# Patient Record
Sex: MALE | Race: WHITE | Employment: FULL TIME | URBAN - METROPOLITAN AREA
[De-identification: names, ages, dates, MRNs, and addresses within clinical notes are randomized per-mention and may not be internally consistent; named-entity substitution may affect disease eponyms.]

---

## 2019-01-28 ENCOUNTER — HOSPITAL ENCOUNTER (OUTPATIENT)
Dept: BEHAVIORAL HEALTH | Facility: CLINIC | Age: 29
Discharge: HOME | End: 2019-01-28
Attending: SOCIAL WORKER | Admitting: SOCIAL WORKER
Payer: COMMERCIAL

## 2019-01-28 VITALS
SYSTOLIC BLOOD PRESSURE: 146 MMHG | HEART RATE: 81 BPM | WEIGHT: 243 LBS | HEIGHT: 76 IN | DIASTOLIC BLOOD PRESSURE: 100 MMHG | BODY MASS INDEX: 29.59 KG/M2

## 2019-01-28 PROCEDURE — H0001 ALCOHOL AND/OR DRUG ASSESS: HCPCS

## 2019-01-28 RX ORDER — SERTRALINE HYDROCHLORIDE 25 MG/1
25 TABLET, FILM COATED ORAL
COMMUNITY
Start: 2019-01-22 | End: 2020-02-11

## 2019-01-28 ASSESSMENT — ANXIETY QUESTIONNAIRES
GAD7 TOTAL SCORE: 16
2. NOT BEING ABLE TO STOP OR CONTROL WORRYING: MORE THAN HALF THE DAYS
3. WORRYING TOO MUCH ABOUT DIFFERENT THINGS: MORE THAN HALF THE DAYS
6. BECOMING EASILY ANNOYED OR IRRITABLE: NEARLY EVERY DAY
1. FEELING NERVOUS, ANXIOUS, OR ON EDGE: MORE THAN HALF THE DAYS
5. BEING SO RESTLESS THAT IT IS HARD TO SIT STILL: SEVERAL DAYS
4. TROUBLE RELAXING: NEARLY EVERY DAY
7. FEELING AFRAID AS IF SOMETHING AWFUL MIGHT HAPPEN: NEARLY EVERY DAY

## 2019-01-28 ASSESSMENT — MIFFLIN-ST. JEOR: SCORE: 2178.74

## 2019-01-28 ASSESSMENT — PAIN SCALES - GENERAL: PAINLEVEL: NO PAIN (0)

## 2019-01-28 NOTE — PROGRESS NOTES
[facility]  [address] [place] [id number]      ADULT CD ASSESSMENT ADDENDUM      Patient Name: Nolan Mayo  Cell Phone:   Home: [phone] (home)    Mobile:   Telephone Information:   Mobile [phone]       Email:  [email or web address]  Emergency Contact: Edyta Mayo (wife)    Tel: [phone]    The patient reported being:      With which race do you identify? White    Initial Screening Questions     1. Are you currently having severe withdrawal symptoms that are putting yourself or others in danger?  No    2. Are you currently having severe medical problems that require immediate attention?  No    3. Are you currently having severe emotional or behavioral problems that are putting yourself or others at risk of harm?  No    4. Do you have sufficient reading skills that will enable you to understand written materials, including the program rules and client rights materials?  Yes     Family History and other additional information     Who raised you? (parents, grandparents, adoptive parents, step-parents, etc.)    Both Parents    Please tell me what it was like growing up in your family. (please include any history of substance abuse, mental health issues, emotional/physical/sexual abuse, forms of discipline, and support)     He reported being raised by both parents as an only child.  He reported growing up in a consistently supportive environment, but his father was a heavy drinker and his father would usually pass out in the evening from his drinking.  He reported his father wasn't an angry or abusive when he drinking alcohol, but he reported his parents would have some arguments related to his father's heavy drinking of alcohol.  He denied being aware of any abuse issues in the family home.  He reported discipline as a child included getting spanked when he was a young child, but as he got older discipline would be having privileges taken away or being grounded.    Do you  have any children or Stepchildren? No    Are you being investigated by Child Protection Services? No    Do you have a child protection worker, probation office or ?  No    How would you describe your current finances?  Doing okay    If you are having problems, (unpaid bills, bankruptcy, IRS problems) please explain:  No    If working or a student are you able to function appropriately in that setting? Yes     Describe your preferred learning style:  by hands-on practice    What are your some of your personal strengths? Self sufficient, independent, and moderately good at everything.    Do you currently participate in community joi activities, such as attending Latter-day, temple, Hindu or Lutheran services?  No    How does your spirituality impact your recovery?  It has some impact and helps out in asking forgiveness.    Do you currently self-administer your medications?  Yes    Have you ever had to lie to people important to you about how much you leblanc?   No   Have you ever felt the need to bet more and more money?   No   Have you ever attempted treatment for a gambling problem?   No   Have you ever touched or fondled someone else inappropriately or forced them to have sex with you against their will?   No   Are you or have you ever been a registered sex offender?   No   Is there any history of sexual abuse in your family? No     Have you ever felt obsessed by your sexual behavior, such as having sex with many partners, masturbating often, using pornography often?   No     Have you ever received therapy or stayed in the hospital for mental health problems?   No     Have you ever hurt yourself, such as cutting, burning or hitting yourself?   No     Have you ever purged, binged or restricted yourself as a way to control your weight?   No     Are you on a special diet?   No     Do you have any concerns regarding your nutritional status?   No     Have you had any appetite changes in the last 3 months?    No   Have you had weight loss or weight gain of more than 10 lbs in the last 3 months?   If patient gained or lost more than 10 lbs, then refer to program RN / attending Physician for assessment.   Yes, explain: 67 lbs over the past year related to his alcohol intake.   Was the patient informed of BMI?    Above,  General nutrition education   Yes   Have you engaged in any risk-taking behavior that would put you at risk for exposure to blood-borne or sexually transmitted diseases?   No   Do you have any dental problems?   No   Have you ever lived through any trauma or stressful life events?   Yes, explain: The patient reported having some trauma issues in 2013 related to a break-up with an ex-girlfriend who cheated on him in at the same time when he was working at a dead-end job that he hated and felt stuck in at the time.     In the past month, have you had any of the following symptoms related to the trauma listed above? (dreams, intense memories, flashbacks, physical reactions, etc.)   Yes, explain: dreams of losing everything.   Have you ever believed people were spying on you, or that someone was plotting against you or trying to hurt you?   No   Have you ever believed someone was reading your mind or could hear your thoughts or that you could actually read someone's mind or hear what another person was thinking?   No   Have you ever believed that someone of some force outside of yourself was putting thoughts into your mind or made you act in a way that was not your usual self?  Have you ever though you were possessed?   No   Have you ever believed you were being sent special messages through the TV, radio or newspaper?   No   Have you ever heard things other people couldn't hear, such as voices or other noises?   No   Have you ever had visions when you were awake?  Or have you ever seen things other people couldn't see?   No   Do you have a valid 's license?    Yes     PHQ-9, JOSE L-7 and Suicide Risk  Assessment   PHQ-9 on 1/28/2019 JOSE L-7 on 1/28/2019   The patient's PHQ-9 score was 18 out of 27, indicating moderately severe depression.   The patient's JOSE L-7 score was 16 out of 21, indicating severe anxiety.       [place]-Suicide Severity Rating Scale   Suicide Ideation   1.) Have you ever wished you were dead or that you could go to sleep and not wake up?     Lifetime:  No   Past Month:  No     2.) Have you actually had any thoughts of killing yourself?   Lifetime:  No   Past Month:  No     3.) Have you been thinking about how you might do this?     Lifetime:  No   Past Month:  No     4.) Have you had these thoughts and had some intention of acting on them?     Lifetime:  No   Past Month:  No     5.) Have you started to work out the details of how to kill yourself?   Lifetime:  No   Past Month:  No     6.) Do you intend to carry out this plan?      Lifetime:  No   Past Month:  No     Intensity of Ideation   Intensity of ideation (1 being least severe, 5 being most severe):     Lifetime:  The patient denied ever having any suicidal thoughts in life.   Past Month:  The patient denied ever having any suicidal thoughts in life.     How often do you have these thoughts?  The patient denied ever having any suicidal thoughts in life.     When you have the thoughts how long do they last?  The patient denied ever having any suicidal thoughts in life.     Can you stop thinking about killing yourself or wanting to die if you want to?  The patient denied ever having any suicidal thoughts in life.     Are there things - anyone or anything (i.e. family, Advent, pain of death) that stopped you from wanting to die or acting on thoughts of suicide?  Does not apply     What sort of reasons did you have for thinking about wanting to die or killing yourself (ie end pain, stop how you were feeling, get attention or reaction, revenge)?  Does not apply     Suicidal Behavior   (Suicide Attempt) - Have you made a suicide attempt?      Lifetime:  The patient had never made a suicide attempt.   Past Month:  The patient had never made a suicide attempt.     Have you engaged in self-harm (non-suicidal self-injury)?  The patient denied having any history of engaging in self-harm (non-suicidal self-injury).     (Interrupted Attempt) - Has there been a time when you started to do something to end your life but someone or something stopped you before you actually did anything?  No     (Aborted or Self-Interrupted Attempt) - Has there been a time when you started to do something to try to end your life but you stopped yourself before you actually did anything?  No     (Preparatory Acts of Behavior) - Have you taken any steps towards making suicide attempt or preparing to kill yourself (such as collecting pills, getting a gun, giving valuables away or writing a suicide note)?  No     Actual Lethality/Medical Damage:  The patient denied ever making a suicidal attempt.       2008  [employer]  Used with permission by Shreya Lynch, PhD.       Guide to C-SSRS Risk Ratings   NO IDEATION:  with no active thoughts IDEATION: with a wish to die. IDEATION: with active thoughts. Risk Ratings   If Yes No No 0 - Very Low Risk   If NA Yes No 1 - Low Risk   If NA Yes Yes 2 - Low/moderate risk   IDEATION: associated thoughts of methods without intent or plan INTENT: Intent to follow through on suicide PLAN: Plan to follow through on suicide Risk Ratings cont...   If Yes No No 3 - Moderate Risk   If Yes Yes No 4 - High Risk   If Yes Yes Yes 5 - High Risk   The patient's ADDITIONAL RISK FACTORS and lack of PROTECTIVE FACTORS may increase their overall suicide risk ratings.     Additional Risk Factors:    Significant history of having untreated or poorly treated mental health symptoms     History of impulsive or aggressive behaviors   Protective Factors:    Having people in his/her life that would prevent the patient from considering a  "suicide attempt (i.e. young children, spouse, parents, etc.)     Having easy access to supportive family members     Having cultural, Yazidi or spiritual beliefs that discourage suicide     Risk Status   Past month:0. - Very Low Risk:  Evaluation Counselors:  Document in Epic / SBAR to counselor \"Very Low Risk\".      Treatment Counselors:  Reassess upon admission as applicable, assess weekly in progress notes under [facility] and summarize in Discharge / Treatment summary under [facility].    Past 24 hours:0. - Very Low Risk:  Evaluation Counselors:  Document in Epic / SBAR to counselor \"Very Low Risk\".      Treatment Counselors:  Reassess upon admission as applicable, assess weekly in progress notes under [facility] and summarize in Discharge / Treatment summary under [facility].   Additional information to support suicide risk rating: There was no additional information to provide at this time.     Mental Health Status   Physical Appearance/Attire: Appears stated age   Hygiene: well groomed   Eye Contact: at examiner   Speech Rate:  regular   Speech Volume: regular   Speech Quality: fluid   Cognitive/Perceptual:  reality based   Cognition: memory intact    Judgment: intact   Insight: intact   Orientation:  time, place, person and situation   Thought: logical    Hallucinations:  none   General Behavioral Tone: cooperative   Psychomotor Activity: no problem noted   Gait:  no problem   Mood: appropriate   Affect: congruence/appropriate   Counselor Notes: NA     Criteria for Diagnosis: DSM-5 Criteria for Substance Use Disorders      1.)  Alcohol Use Disorder Severe - 303.90 (F10.20)  2.)  Cannabis Use Disorder Severe - 304.30 (F12.20), in sustained remission  3.)  Tobacco Use Disorder Moderate - 305.10 (F17.200), in sustained remission  4.)  Anxiety disorder NOS, per patient self-report    Level of Care   I.) Intoxication and Withdrawal: 0   II.) Biomedical:  0   III.) Emotional and Behavioral:  2   IV.) " Readiness to Change:  2   V.) Relapse Potential: 3   VI.) Recovery Environmental: 2     Initial Problem List     The patient lacks relapse prevention skills  The patient has poor coping skills  The patient has poor refusal skills   The patient lacks a sober peer support network  The patient has dual issues of MI and CD    Patient/Client is willing to follow treatment recommendations.  Yes    Counselor: [facility] [place]    Vulnerable Adult Checklist for OUTPATIENTS     1.  Do you have a physical, emotional or mental infirmity or dysfunction?       Yes (explain) - History of Anxiety D/O NOS    2.  Does this issue impair your ability to provide for your own care without help, including providing yourself with food, shelter, clothing, healthcare or supervision?       No    3.  Because of this issue, I need assistance to protect myself from maltreatment by others.      No    Based on the above information:    This person is not a functional Vulnerable Adult according to [place] Statute 626.5572 subdivision 21.

## 2019-01-28 NOTE — PROGRESS NOTES
Rule 25 Assessment  Background Information   1. Date of Assessment Request  2. Date of Assessment  1/28/2019 3. Date Service Authorized     4.   SERJIO Coelho   5.  Phone Number   [phone] 6. Referent  [facility] 7. Assessment Site  [facility]     8. Client Name   Nolan Mayo 9. Date of Birth  1990 Age  28 year old 10. Gender  male  11. PMI/ Insurance No.  [id number]   12. Client's Primary Language:  English 13. Do you require special accommodations, such as an  or assistance with written material? No   14. Current Address: [address] [place] [address]   15. Client Phone Numbers: [phone] (home)      16. Tell me what has happened to bring you here today.    The patient came to [facility] for evaluation of a possible substance use disorder.  The reason for the substance abuse assessment was due to the patient's own awareness that he needed help and due to pressure from his wife for him to stop his use of alcohol.  The patient reported from 8/2017 until 3/2017, he had a pattern of drinking 1 pint to 1 liter of bourbon on an almost daily basis until stopping his use of alcohol completely in 3/2017.  The patient reported having no use of alcohol from 3/2017 until relapsing with alcohol again in early 11/2018.  The patient reported he had stopped drinking alcohol at that period of time secondary to being engaged to his wife who he  in 10/2018 because she did not approve of the patient drinking alcohol.  The patient reported relapsing with alcohol in early 11/2018, secondary to feeling bored and feeling like he would be able to control and/or hide his use of alcohol from others.  After relapsing in early 11/2018, his use of alcohol escalated back to drinking 1 pint to 1 liter of bourbon on an almost daily basis.  The patient reported having a job where he is relatively independent and he reported that he  had been pretty good at hiding his use of alcohol from others until more recently when it became apparent to his wife he had returned to drinking alcohol again.  The patient's wife has given him the ultimatum that she will not have children with him or continue in the relationship if he continues to drink alcohol.  The patient reported he wanted to enter the [facility] Primary Day Outpatient program at [facility] for primary substance abuse treatment.  The patient was informed the [facility] Day Outpatient program at [facility] was mainly for adults 40 and over, so there would not really be anyone in the treatment program in his same age range.  However, the patient stated it was the only one of our substance abuse treatment programs that would work with his current work hours, so he wished to proceed with a referral for primary substance abuse treatment at the [facility] Day Outpatient program at [facility].     17. Have you had other rule 25 assessments?     No    DIMENSION I - Acute Intoxication /Withdrawal Potential   1. Chemical use most recent 12 months outside a facility and other significant use history (client self-report)              X = Primary Drug Used   Age of First Use Most Recent Pattern of Use and Duration   Need enough information to show pattern (both frequency and amounts) and to show tolerance for each chemical that has a diagnosis   Date of last use and time, if needed   Withdrawal Potential? Requiring special care Method of use  (oral, smoked, snort, IV, etc)     X Alcohol     18 HU: 8/2017 until 3/2018, when he reported a pattern of drinking 1 pint to 1 liter of bourbon on an almost daily basis.    From 3/2018 until early 11/2018 no use of alcohol when he was meeting with a 1:1 counselor, attending AA meetings 1 time per week as well as being engaging to his wife during that period of time.    He reported getting  in 10/2018.    He relapsed with alcohol in  early 11/2018 after being bored and feeling like he could eventually go back to drinking alcohol.    Since early 11/2018, he reported a pattern of drinking 1 pint to 1 liter of bourbon on an almost daily basis.   1/20/2019    (750 mL of bourbon) None Oral      Marijuana/  Hashish   17 HU: 2010 to 2012, when he reported a pattern of smoking 1 bowl of THC on a daily basis.    Within the past year he reported smoking 1 bowl of THC on 2 occasions.   9/2018 None Smoke      Cocaine/Crack     No use          Meth/  Amphetamines   No use          Heroin     No use          Other Opiates/  Synthetics   No use          Inhalants     No use          Benzodiazepines     No use          Hallucinogens     22 Mushrooms 3 times in life 2013. 2013 None Oral      Barbiturates/  Sedatives/  Hypnotics No use          Over-the-Counter Drugs   No use          Other     23 Jennifer: 1 time in life at age 23. 23 None Oral      Nicotine     22 He reported smoking cigarettes a half year at a time between the ages of 22 and 27, when he reported a pattern of smoking 1 pack of cigarettes on a daily basis.   1 year ago None Oral     2. Do you use greater amounts of alcohol/other drugs to feel intoxicated or achieve the desired effect?  Yes.  Or use the same amount and get less of an effect?  Yes.  Example: The patient reported having increased use and tolerance issues with alcohol.    3A. Have you ever been to detox?     No    3B. When was the first time?     The patient denied ever having a detoxification admission.    3C. How many times since then?     The patient denied ever having a detoxification admission.    3D. Date of most recent detox:     The patient denied ever having a detoxification admission.    4.  Withdrawal symptoms: Have you had any of the following withdrawal symptoms?  Past 12 months Recent (past 30 days)   Unable to Sleep  Agitation  Headache  Fatigue / Extremely Tired  Sad / Depressed Feeling  Muscle Aches  Vivid / Unpleasant  Dreams  Irritability  Sensitivity to Noise  High Blood Pressure  Diarrhea  Confused / Disrupted Speech  Anxiety / Worried Unable to Sleep  Agitation  Headache  Fatigue / Extremely Tired  Sad / Depressed Feeling  Muscle Aches  Vivid / Unpleasant Dreams  Irritability  Sensitivity to Noise  High Blood Pressure  Diarrhea  Confused / Disrupted Speech  Anxiety / Worried     's Visual Observations and Symptoms: No visible withdrawal symptoms at this time    Based on the above information, is withdrawal likely to require attention as part of treatment participation?  Yes    Dimension I Ratings   Acute intoxication/Withdrawal potential - The placing authority must use the criteria in Dimension I to determine a client s acute intoxication and withdrawal potential.    RISK DESCRIPTIONS - Severity ratin Client displays full functioning with good ability to tolerate and cope with withdrawal discomfort. No signs or symptoms of intoxication or withdrawal or resolving signs or symptoms.    REASONS SEVERITY WAS ASSIGNED (What about the amount of the person s use and date of most recent use and history of withdrawal problems suggests the potential of withdrawal symptoms requiring professional assistance? )     The patient does not appear to have any risk of having significant withdrawal symptoms at this time.         DIMENSION II - Biomedical Complications and Conditions   1a. Do you have any current health/medical conditions?(Include any infectious diseases, allergies, or chronic or acute pain, history of chronic conditions)       Yes.   Illnesses/Medical Conditions you are receiving care for:   Past Medical History:   Diagnosis Date     Anxiety      Childhood asthma      1b. On a scale of mild, moderate to severe please specify the severity of the patient's diabetes and/or neuropathy.    The patient denied having a history of being diagnosed with diabetes or neuropathy.     2. Do you have a health care provider? When  was your most recent appointment? What concerns were identified?     The patient's Primary Medical Clinic is [facility].    3. If indicated by answers to items 1 or 2: How do you deal with these concerns? Is that working for you? If you are not receiving care for this problem, why not?      The patient reported taking prescription medications as prescribed for the above medical issues.    4A. List current medication(s) including over-the-counter or herbal supplements--including pain management:     Current Outpatient Medications   Medication     sertraline (ZOLOFT) 25 MG tablet     No current facility-administered medications for this encounter.      4B. Do you follow current medical recommendations/take medications as prescribed?     Yes    4C. When did you last take your medication?     2019    4D. Do you need a referral to have a follow up with a primary care physician?    No.    5. Has a health care provider/healer ever recommended that you reduce or quit alcohol/drug use?     Yes    6. Are you pregnant?     NA, because the patient is male    7. Have you had any injuries, assaults/violence towards you, accidents, health related issues, overdose(s) or hospitalizations related to your use of alcohol or other drugs:     Yes, explain: Minor bumps and bruises.    8. Do you have any specific physical needs/accommodations? No    Dimension II Ratings   Biomedical Conditions and Complications - The placing authority must use the criteria in Dimension II to determine a client s biomedical conditions and complications.   RISK DESCRIPTIONS - Severity ratin Client displays full functioning with good ability to cope with physical discomfort.    REASONS SEVERITY WAS ASSIGNED (What physical/medical problems does this person have that would inhibit his or her ability to participate in treatment? What issues does he or she have that require assistance to address?)    The patient reported having a history of the above  medical issues, but he reported being medically stable at this time and he denied having any other history of chronic medical problems.  The patient reported taking the above medications, but he denied taking any other prescription or OTC medications at this time.  The patient would benefit from following all of the recommendations of his medical providers.          DIMENSION III - Emotional, Behavioral, Cognitive Conditions and Complications   1. (Optional) Tell me what it was like growing up in your family. (substance use, mental health, discipline, abuse, support)     He reported being raised by both parents as an only child.  He reported growing up in a consistently supportive environment, but his father was a heavy drinker and his father would usually pass out in the evening from his drinking.  He reported his father wasn't an angry or abusive when he drinking alcohol, but he reported his parents would have some arguments related to his father's heavy drinking of alcohol.  He denied being aware of any abuse issues in the family home.  He reported discipline as a child included getting spanked when he was a young child, but as he got older discipline would be having privileges taken away or being grounded.    2. When was the last time that you had significant problems...  A. with feeling very trapped, lonely, sad, blue, depressed or hopeless  about the future? Past Month    B. with sleep trouble, such as bad dreams, sleeping restlessly, or falling  asleep during the day? Past Month    C. with feeling very anxious, nervous, tense, scared, panicked, or like  something bad was going to happen? Past Month    D. with becoming very distressed and upset when something reminded  you of the past? Past Month    E. with thinking about ending your life or committing suicide? Never    3. When was the last time that you did the following things two or more times?  A. Lied or conned to get things you wanted or to avoid having  to do  something? Past Month    B. Had a hard time paying attention at school, work, or home? Past Month    C. Had a hard time listening to instructions at school, work, or home? Past Month    D. Were a bully or threatened other people? 2 - 12 months ago    E. Started physical fights with other people? 1+ years ago    Note: These questions are from the Global Appraisal of Individual Needs--Short Screener. Any item marked  past month  or  2 to 12 months ago  will be scored with a severity rating of at least 2.     For each item that has occurred in the past month or past year ask follow up questions to determine how often the person has felt this way or has the behavior occurred? How recently? How has it affected their daily living? And, whether they were using or in withdrawal at the time?    2A.) The patient reported having increased depression over this past year.  2B.) The patient reported having increased sleep problems over this past year.  2C.) The patient reported having increased anxiety over this past year.  2D.) The patient reported having increased distress regarding the past over this past year.  3A.) The patient reported he had been making significant attempts to hide his use of alcohol from others and he had also lied to others about his use of alcohol.  3B.) The patient reported having some decreased concentration over this past year.  3C.) The patient reported having some decreased concentration over this past year.    4A. If the person has answered item 2E with  in the past year  or  the past month , ask about frequency and history of suicide in the family or someone close and whether they were under the influence.     The patient denied any family member or someone close to the patient had ever completed suicide.    Any history of suicide in your family? Or someone close to you?     The patient denied any family member or someone close to the patient had ever completed suicide.    4B. If the person  answered item 2E  in the past month  ask about  intent, plan, means and access and any other follow-up information  to determine imminent risk. Document any actions taken to intervene  on any identified imminent risk.      The patient denied having any suicide ideation within the past month.    5A. Have you ever been diagnosed with a mental health problem?     Yes, explain: The patient reported a history of Anxiety disorder NOS.      5B. Are you receiving care for any mental health issues? If yes, what is the focus of that care or treatment?  Are you satisfied with the service? Most recent appointment?  How has it been helpful?     Yes, The patient reported taking his prescribed psychotropic medications as prescribed.  The patient reported he had been working with his current 1:1 mental health therapist since 5/2018.        6. Have you been prescribed medications for emotional/psychological problems?     Yes, The patient reported taking his prescribed psychotropic medications as prescribed.    7. Does your MH provider know about your use?     Yes.  7B. What does he or she have to say about it?(DSM) Recommendation is abstinence from all non-prescribed mood altering chemicals.    8A. Have you ever been verbally, emotionally, physically or sexually abused?      No     Follow up questions to learn current risk, continuing emotional impact.      The patient denied having any history of being verbally, emotionally, physically or sexually abused.    8B. Have you received counseling for abuse?      The patient denied having any history of being verbally, emotionally, physically or sexually abused.    9. Have you ever experienced or been part of a group that experienced community violence, historical trauma, rape or assault?     No    10A. [place]:    No    11. Do you have problems with any of the following things in your daily life?    Problem Solving, Concentration, Performing your job/school work, Remembering and In  relationships with others      Note: If the person has any of the above problems, follow up with items 12, 13, and 14. If none of the issues in item 11 are a problem for the person, skip to item 15.    The patient would benefit from developing sober coping skills.    12. Have you been diagnosed with traumatic brain injury or Alzheimer s?  No    13. If the answer to #12 is no, ask the following questions:    Have you ever hit your head or been hit on the head? Yes    Were you ever seen in the Emergency Room, hospital or by a doctor because of an injury to your head? Yes    Have you had any significant illness that affected your brain (brain tumor, meningitis, West Nile Virus, stroke or seizure, heart attack, near drowning or near suffocation)? No    14. If the answer to #12 is yes, ask if any of the problems identified in #11 occurred since the head injury or loss of oxygen. No    15A. Highest grade of school completed:     College graduate    15B. Do you have a learning disability? Yes, ADHD in the past.  He was on Adderall in the past, but he hasn't been on any medications since .  He reported using the medication only as prescribed.    15C. Did you ever have tutoring in Math or English? No    15D. Have you ever been diagnosed with Fetal Alcohol Effects or Fetal Alcohol Syndrome? No    16. If yes to item 15 B, C, or D: How has this affected your use or been affected by your use?     Yes, possibly more impulsive.    Dimension III Ratings   Emotional/Behavioral/Cognitive - The placing authority must use the criteria in Dimension III to determine a client s emotional, behavioral, and cognitive conditions and complications.   RISK DESCRIPTIONS - Severity ratin Client has difficulty with impulse control and lacks coping skills. Client has thoughts of suicide or harm to others without means; however, the thoughts may interfere with participation in some treatment activities. Client has difficulty functioning in  significant life areas. Client has moderate symptoms of emotional, behavioral, or cognitive problems. Client is able to participate in most treatment activities.    REASONS SEVERITY WAS ASSIGNED - What current issues might with thinking, feelings or behavior pose barriers to participation in a treatment program? What coping skills or other assets does the person have to offset those issues? Are these problems that can be initially accommodated by a treatment provider? If not, what specialized skills or attributes must a provider have?    The patient reported a history of Anxiety disorder NOS.  The patient reported taking his prescribed psychotropic medications as prescribed.  The patient reported he had been working with his current 1:1 mental health therapist since 5/2018.  The patient has difficulty with impulse control and he lacks sober coping skills.  The patient denied having any history of being verbally, emotionally, physically or sexually abused.  The patient reported having some trauma issues in 2013 related to a break-up with an ex-girlfriend who cheated on him in at the same time when he was working at a dead-end job that he hated and felt stuck in at the time.  The patient denied having any history of suicide ideation or suicide attempts.  The patient denied having any history of self-injurious behavior.   The patient's PHQ-9 score was 18 out of 27, indicating moderately severe depression.  The patient's JOSE L-7 score was 16 out of 21, indicating severe anxiety.  The patient would benefit from following all of the recommendations of his mental health providers.           DIMENSION IV - Readiness for Change   1. You ve told me what brought you here today. (first section) What do you think the problem really is?     Dual issues of MI and CD as well as a lack a sober coping skills.    2. Tell me how things are going. Ask enough questions to determine whether the person has use related problems or assets that  can be built upon in the following areas: Family/friends/relationships; Legal; Financial; Emotional; Educational; Recreational/ leisure; Vocational/employment; Living arrangements (DSM)      The patient reported living wife and his mother-in-law in a single family home.  He reported neither his wife nor his mother-in-law have any history of substance abuse issues and both of them are very supportive of him not drinking alcohol or using any other non-prescribed mood altering chemicals.  The patient denied having any concerns regarding his immediate living environment and/or neighborhood and he plans to continue to live there.  The patient identified as being heterosexual and he reported in a relationship with his wife for the past 4 years and being  in 10/2018.  The patient reported having significant relationship conflict with his wife due to his alcohol abuse.  The patient reported that his wife, his father, his mother and a few close friends are all very supportive of him abstaining from alcohol and all other non-prescribed mood altering chemicals.  The patient denied having any children.  The patient reported just recently starting to attend 12-step support group meetings and he would benefit from continuing to develop his sober peer support network.  He reported alcohol is not an essential part of his social connections with his friends, but most of friends drink alcohol.  The patient denied having any history of legal charges.  The patient reported most of his recent heavy use of alcohol had been done alone with attempts to hide his use of alcohol from others.  The patient reported working full-time as a  for the past 4 years.  The patient denied his use of alcohol had caused any problems with his employment and he had never been confronted by an employer in regards to his alcohol use and/or his work performance.  The patient denied having any current financial problems.    3. What  activities have you engaged in when using alcohol/other drugs that could be hazardous to you or others (i.e. driving a car/motorcycle/boat, operating machinery, unsafe sex, sharing needles for drugs or tattoos, etc     The patient reported having a history of driving while under the influnece of alcohol or drugs.    4. How much time do you spend getting, using or getting over using alcohol or drugs? (DSM)     On a daily basis.    5. Reasons for drinking/drug use (Use the space below to record answers. It may not be necessary to ask each item.)  Like the feeling Yes   Trying to forget problems Yes   To cope with stress Yes   To relieve physical pain No   To cope with anxiety Yes   To cope with depression Yes   To relax or unwind Yes   Makes it easier to talk with people No   Partner encourages use No   Most friends drink or use Yes   To cope with family problems No   Afraid of withdrawal symptoms/to feel better Yes   Other (specify)  No     A. What concerns other people about your alcohol or drug use/Has anyone told you that you use too much? What did they say? (DSM)     Wife didn't want to  a drink and won't have children with him if he continues to drink alcohol.      B. What did you think about that/ do you think you have a problem with alcohol or drug use?     He agrees he has a substance abuse problem.    6. What changes are you willing to make? What substance are you willing to stop using? How are you going to do that? Have you tried that before? What interfered with your success with that goal?      His plan and goal is to abstain from non-prescribed all mood altering chemicals.     7. What would be helpful to you in making this change?     Entering the [facility] program for primary CD treatment, developing sober coping skills , developing long-term sober maintenance skills, developing a sober peer support network and addressing dual issues of MI and CD.    Dimension IV Ratings   Readiness  for Change - The placing authority must use the criteria in Dimension IV to determine a client s readiness for change.   RISK DESCRIPTIONS - Severity ratin Client displays verbal compliance, but lacks consistent behaviors; has low motivation for change; and is passively involved in treatment.    REASONS SEVERITY WAS ASSIGNED - (What information did the person provide that supports your assessment of his or her readiness to change? How aware is the person of problems caused by continued use? How willing is she or he to make changes? What does the person feel would be helpful? What has the person been able to do without help?)      The patient displayed verbal compliance to abstain from all mood altering chemicals, but he had lacked consistent behavior to support abstinence, including returning to drinking alcohol on a daily basis over the past several months despite having no use of alcohol from 3/2018 until early 2018.  The patient did appear to be willing to enter the Mixed Primary Day Outpatient program at [facility] for primary CD treatment.         DIMENSION V - Relapse, Continued Use, and Continued Problem Potential   1A. In what ways have you tried to control, cut-down or quit your use? If you have had periods of sobriety, how did you accomplish that? What was helpful? What happened to prevent you from continuing your sobriety? (DSM)     He was sober from 3/2018 until relapsing with alcohol again in early 2018.  The patient reported he had stopped drinking alcohol at that period of time secondary to being engaged to his wife who he  in 10/2018 because she did not approve of the patient drinking alcohol.    1B. What were the circumstances of your most recent relapse with mood altering chemicals?    The patient reported relapsing with alcohol in early 2018, secondary to feeling bored and feeling like he would be able to control and/or hide his use of alcohol from others.    2. Have  you experienced cravings? If yes, ask follow up questions to determine if the person recognizes triggers and if the person has had any success in dealing with them.     The patient reported having cravings to use mood altering chemicals on an almost daily basis.    3. Have you been treated for alcohol/other drug abuse/dependence? No    4. Support group participation: Have you/do you attend support group meetings to reduce/stop your alcohol/drug use? How recently? What was your experience? Are you willing to restart? If the person has not participated, is he or she willing?     The patient reported attending 12-step or other support group meetings up to 1 time per week.    5. What would assist you in staying sober/straight?     Entering the [facility] Outpatient program for primary CD treatment, developing sober coping skills , developing long-term sober maintenance skills, developing a sober peer support network and addressing dual issues of MI and CD.    Dimension V Ratings   Relapse/Continued Use/Continued problem potential - The placing authority must use the criteria in Dimension V to determine a client s relapse, continued use, and continued problem potential.   RISK DESCRIPTIONS - Severity rating: 3 Client has poor recognition and understanding of relapse and recidivism issues and displays moderately high vulnerability for further substance use or mental health problems. Client has few coping skills and rarely applies coping skills.    REASONS SEVERITY WAS ASSIGNED - (What information did the person provide that indicates his or her understanding of relapse issues? What about the person s experience indicates how prone he or she is to relapse? What coping skills does the person have that decrease relapse potential?)      The patient had continued to abuse mood altering chemicals despite having negative consequences in many life areas, including having mental health issues and relationship problems that were  exacerbated by his substance abuse.  The patient appears to lack sober coping skills and he lacks long-term sober maintenance skills.  The patient has dual issues of mental health issues and substance abuse.  The patient lacks awareness regarding the disease model of addiction.  The patient lacks a sober peer support network.         DIMENSION VI - Recovery Environment   1. Are you employed/attending school? Tell me about that.     The patient reported working full-time as a  for the past 4 years.  The patient denied his use of alcohol had caused any problems with his employment and he had never been confronted by an employer in regards to his alcohol use and/or his work performance.     2A. Describe a typical day; evening for you. Work, school, social, leisure, volunteer, spiritual practices. Include time spent obtaining, using, recovering from drugs or alcohol. (DSM)     Wake up at 7:30 am, his is in his vehicle by 8:00 am, get all of his work done and get his reports in, get home around 4:30 pm, start drinking alcohol before his wife would get home, hide some alcohol in the home, have dinner and stay up until around 2:00 am finishing up the alcohol.    Please describe what leisure activities have been associated with your substance abuse:     His recent heavy use of alcohol had been done in isolation with attempts to hide his use of alcohol from others.    2B. How often do you spend more time than you planned using or use more than you planned? (DSM)     Any use is more than intended at this time.    3. How important is using to your social connections? Do many of your family or friends use?     He reported alcohol is not an essential part of his social connections with his friends, but most of friends drink alcohol.    4A. Are you currently in a significant relationship?     Yes.  4B. How long? Past 4 years.              Please describe your significant other's use of mood altering chemicals? He  reported his wife would drink 6 drinks a year at the most.    4C. Sexual Orientation:     Heterosexual    5A. Who do you live with?      The patient reported living wife and his mother-in-law in a single family home.        5B. Tell me about their alcohol/drug use and mental health issues.     He reported neither his wife nor his mother-in-law have any history of substance abuse issues and both of them are very supportive of him not drinking alcohol or using any other non-prescribed mood altering chemicals.    5C. Are you concerned for your safety there? No    5D. Are you concerned about the safety of anyone else who lives with you? No    6A. Do you have children who live with you?     No    6B. Do you have children who do not live with you?     No    7A. Who supports you in making changes in your alcohol or drug use? What are they willing to do to support you? Who is upset or angry about you making changes in your alcohol or drug use? How big a problem is this for you?      The patient reported that his wife, his father, his mother and a few close friends are all very supportive of him abstaining from alcohol and all other non-prescribed mood altering chemicals.     7B. This table is provided to record information about the person s relationships and available support It is not necessary to ask each item; only to get a comprehensive picture of their support system.  How often can you count on the following people when you need someone?   Partner / Spouse Always supportive   Parent(s)/Aunt(s)/Uncle(s)/Grandparents Always supportive   Sibling(s)/Cousin(s) The patient doesn't have any siblings.   Child(al) The patient doesn't have any children.   Other relative(s) Always supportive   Friend(s)/neighbor(s) Usually supportive   Child(al) s father(s)/mother(s) The patient doesn't have any children.   Support group member(s) Always supportive   Community of joi members The patient denied having any current involvement  with community joi members.   /counselor/therapist/healer Always supportive   Other (specify) No     8A. What is your current living situation?     The patient reported living wife and his mother-in-law in a single family home.  He reported neither his wife nor his mother-in-law have any history of substance abuse issues and both of them are very supportive of him not drinking alcohol or using any other non-prescribed mood altering chemicals.  The patient denied having any concerns regarding his immediate living environment and/or neighborhood and he plans to continue to live there.     8B. What is your long term plan for where you will be living?     The plan is to continue to live at current residence.    8C. Tell me about your living environment/neighborhood? Ask enough follow up questions to determine safety, criminal activity, availability of alcohol and drugs, supportive or antagonistic to the person making changes.      The patient denied having any concerns regarding his immediate living environment and/or neighborhood and he plans to continue to live there.     9. Criminal justice history: Gather current/recent history and any significant history related to substance use--Arrests? Convictions? Circumstances? Alcohol or drug involvement? Sentences? Still on probation or parole? Expectations of the court? Current court order? Any sex offenses - lifetime? What level? (DSM)    None    10. What obstacles exist to participating in treatment? (Time off work, childcare, funding, transportation, pending group home time, living situation)     Time off of work.    Dimension VI Ratings   Recovery environment - The placing authority must use the criteria in Dimension VI to determine a client s recovery environment.   RISK DESCRIPTIONS - Severity ratin Client is engaged in structured, meaningful activity, but peers, family, significant other, and living environment are unsupportive, or there is criminal  justice involvement by the client or among the client's peers, significant others, or in the client's living environment.    REASONS SEVERITY WAS ASSIGNED - (What support does the person have for making changes? What structure/stability does the person have in his or her daily life that will increase the likelihood that changes can be sustained? What problems exist in the person s environment that will jeopardize getting/staying clean and sober?)     The patient reported living wife and his mother-in-law in a single family home.  He reported neither his wife nor his mother-in-law have any history of substance abuse issues and both of them are very supportive of him not drinking alcohol or using any other non-prescribed mood altering chemicals.  The patient denied having any concerns regarding his immediate living environment and/or neighborhood and he plans to continue to live there.  The patient identified as being heterosexual and he reported in a relationship with his wife for the past 4 years and being  in 10/2018.  The patient reported having significant relationship conflict with his wife due to his alcohol abuse.  The patient reported that his wife, his father, his mother and a few close friends are all very supportive of him abstaining from alcohol and all other non-prescribed mood altering chemicals.  The patient denied having any children.  The patient reported just recently starting to attend 12-step support group meetings and he would benefit from continuing to develop his sober peer support network.  He reported alcohol is not an essential part of his social connections with his friends, but most of friends drink alcohol.  The patient denied having any history of legal charges.  The patient reported most of his recent heavy use of alcohol had been done alone with attempts to hide his use of alcohol from others.  The patient reported working full-time as a  for the past 4 years.  The  patient denied his use of alcohol had caused any problems with his employment and he had never been confronted by an employer in regards to his alcohol use and/or his work performance.  The patient denied having any current financial problems.         Client Choice/Exceptions   Would you like services specific to language, age, gender, culture, Rastafari preference, race, ethnicity, sexual orientation or disability?  No    What particular treatment choices and options would you like to have? [facility] Primary Day Outpatient program at [facility] for primary CD treatment    Do you have a preference for a particular treatment program? [facility]    Criteria for Diagnosis     Criteria for Diagnosis  DSM-5 Criteria for Substance Use Disorder  Instructions: Determine whether the client currently meets the criteria for Substance Use Disorder using the diagnostic criteria in the DSM-V pp.481-589. Current means during the most recent 12 months outside a facility that controls access to substances    Category of Substance Severity (ICD-10 Code / DSM 5 Code)     Alcohol Use Disorder Severe  (10.20) (303.90)     Cannabis Use Disorder Severe   (F12.20) (304.30), in sustained remission     Hallucinogen Use Disorder The patient does not meet the criteria for a Hallucinogen use disorder.     Inhalant Use Disorder The patient does not meet the criteria for an Inhalant use disorder.     Opioid Use Disorder The patient does not meet the criteria for an Opioid use disorder.     Sedative, Hypnotic, or Anxiolytic Use Disorder The patient does not meet the criteria for a Sedative/Hypnotic use disorder.     Stimulant Related Disorder The patient does not meet the criteria for a Stimulant use disorder.     Tobacco Use Disorder Moderate   (F17.200) (305.1), in sustained remission     Other (or unknown) Substance Use Disorder The patient does not meet the criteria for a Other (or unknown) Substance use disorder.          Collateral Contact Summary   Number of contacts made: 1    Contact with referring person:  No    If court related records were reviewed, summarize here: No court records had been reviewed at the time of this documentation.    Information from collateral contacts supported/largely agreed with information from the client and associated risk ratings.      Rule 25 Assessment Summary and Plan   's Recommendation    1.)  It was recommended that the patient abstain from alcohol and all other non-prescribed mood altering chemicals.   2.)  Follow all of the recommendations of his medical and mental health providers.  3.)  Enter the Mixed Primary Day Outpatient program at [facility] for primary CD treatment.  4.)  Follow all of the recommendations of his chemical dependency treatment providers.  5.)  Attend support group meetings on a weekly basis.    Collateral Contacts     Name:    Electronic medical records at [facility]   Relationship:    None   Phone Number:    None Releases:    No     The patient's Electronic medical records at [facility] were reviewed and the information contained in the medical records supported the patient's account of his chemical use history and his chemical use consequences.    Collateral Contacts     Name:    Edyta Mayo   Relationship:    Wife   Phone Number:    [phone]   Releases:    Yes     Collateral data had not yet been obtained from this contact at the time of this documentation.    ollateral Contacts      A problematic pattern of alcohol/drug use leading to clinically significant impairment or distress, as manifested by at least two of the following, occurring within a 12-month period:    1.) Alcohol/drug is often taken in larger amounts or over a longer period than was intended.  3.) A great deal of time is spent in activities necessary to obtain alcohol, use alcohol, or recover from its effects.  4.) Craving, or a strong desire or urge to use alcohol/drug  5.)  Recurrent alcohol/drug use resulting in a failure to fulfill major role obligations at work, school or home.  6.) Continued alcohol use despite having persistent or recurrent social or interpersonal problems caused or exacerbated by the effects of alcohol/drug.  8.) Recurrent alcohol/drug use in situations in which it is physically hazardous.  9.) Alcohol/drug use is continued despite knowledge of having a persistent or recurrent physical or psychological problem that is likely to have been caused or exacerbated by alcohol.  10.) Tolerance, as defined by either of the following: A need for markedly increased amounts of alcohol/drug to achieve intoxication or desired effect.  11.) Withdrawal, as manifested by either of the following: The characteristic withdrawal syndrome for alcohol/drug (refer to Criteria A and B of the criteria set for alcohol/drug withdrawal).      Specify if: In early remission:  After full criteria for alcohol/drug use disorder were previously met, none of the criteria for alcohol/drug use disorder have been met for at least 3 months but for less than 12 months (with the exception that Criterion A4,  Craving or a strong desire or urge to use alcohol/drug  may be met).     In sustained remission:   After full criteria for alcohol use disorder were previously met, none of the criteria for alcohol/drug use disorder have been met at any time during a period of 12 months or longer (with the exception that Criterion A4,  Craving or strong desire or urge to use alcohol/drug  may be met).   Specify if:   This additional specifier is used if the individual is in an environment where access to alcohol is restricted.    Mild: Presence of 2-3 symptoms  Moderate: Presence of 4-5 symptoms  Severe: Presence of 6 or more symptoms

## 2019-01-29 ASSESSMENT — ANXIETY QUESTIONNAIRES: GAD7 TOTAL SCORE: 16

## 2019-02-11 ENCOUNTER — HOSPITAL ENCOUNTER (OUTPATIENT)
Dept: BEHAVIORAL HEALTH | Facility: CLINIC | Age: 29
End: 2019-02-11
Attending: SOCIAL WORKER
Payer: COMMERCIAL

## 2019-02-11 PROCEDURE — H2035 A/D TX PROGRAM, PER HOUR: HCPCS | Mod: HQ

## 2019-02-11 PROCEDURE — H2035 A/D TX PROGRAM, PER HOUR: HCPCS

## 2019-02-11 ASSESSMENT — ANXIETY QUESTIONNAIRES
7. FEELING AFRAID AS IF SOMETHING AWFUL MIGHT HAPPEN: SEVERAL DAYS
6. BECOMING EASILY ANNOYED OR IRRITABLE: SEVERAL DAYS
3. WORRYING TOO MUCH ABOUT DIFFERENT THINGS: NEARLY EVERY DAY
IF YOU CHECKED OFF ANY PROBLEMS ON THIS QUESTIONNAIRE, HOW DIFFICULT HAVE THESE PROBLEMS MADE IT FOR YOU TO DO YOUR WORK, TAKE CARE OF THINGS AT HOME, OR GET ALONG WITH OTHER PEOPLE: SOMEWHAT DIFFICULT
GAD7 TOTAL SCORE: 15
5. BEING SO RESTLESS THAT IT IS HARD TO SIT STILL: MORE THAN HALF THE DAYS
1. FEELING NERVOUS, ANXIOUS, OR ON EDGE: MORE THAN HALF THE DAYS
2. NOT BEING ABLE TO STOP OR CONTROL WORRYING: NEARLY EVERY DAY

## 2019-02-11 ASSESSMENT — PATIENT HEALTH QUESTIONNAIRE - PHQ9
SUM OF ALL RESPONSES TO PHQ QUESTIONS 1-9: 10
5. POOR APPETITE OR OVEREATING: NEARLY EVERY DAY

## 2019-02-11 NOTE — PROGRESS NOTES
Comprehensive Assessment Summary     Based on client interview, review of previous assessments and   comprehensive assessment interview the following diagnosis and recommendations are:     Patient: Nolan Mayo  MRN; [id number]   : 1990  Age: 28 year old Sex: male       Client meets criteria for:  Alcohol Use Disorder Severe 303.9/F10.20   Cannabis Use Disorder Severe 304.30/F12.20 in sustained remission  Tobacco Use Disorder Moderate 305.10/F10.20, in sustained remission  Anxiety disorder NOS, per patient self report    Dimension One: Acute Intoxication/Withdrawal Potential     Ratin  (Consider the client's ability to cope with withdrawal symptoms and current state of intoxication)    Client reports last date of use of alcohol was 19.  He displays full functioning and shows no signs of use or withdrawal at this time. Last reported use of THC was 2019 and he reports last smoking cigarettes 1 year ago.    Dimension Two: Biomedical Condition and Complications    Ratin  (Consider the degree to which any physical disorder would interfere with treatment for substance abuse, and the client's ability to tolerate any related discomfort; determine the impact of continued chemical use on the unborn child if the client is pregnant)    He reports he has no medical issues or concerns.  He reports he was a smoker, but quit about a year ago.  He reports being medically stable and,taking medications as directed and that he has good access to care.      Dimension Three: Emotional/Behavioral/Cognitive Conditions & Complications  Ratin  (Determine the degree to which any condition or complications are likely to interfere with treatment for substance abuse or with functioning in significant life areas and the likelihood of risk of harm to self or others)     He reports a history of Anxiety disorder NOS.  He says he takes his prescribed psychotropic medication, Sertraline, as prescribed.  He said he  "began working with a mental health therapist in 2018.  He said he has not told her he is in treatment, but he is going to at his next appointment.  He denies any history of being verbally, emotionally, physically or sexually abused.  He said he did have some trauma issues in  related to a break - up with an ex girlfriend who he reports cheated on him.  He said at that time he was also working a \"dead-end job\" and felt stuck.  He denied having any history of suicide ideation or suicide attepts or history of self injurious behavior   At todays assessment JOSE L-7 score was 16 of 21, indicating sever anxiety.  At today's assessment his PHQ-9 was 10 of 27, indicating moderately severe depression.   He has dual issues of MI and CD  He reports he works with a therapist and sees her as needed, which is approximately 1x per month right now.    Dimension Four: Treatment Acceptance/Resistance     Ratin  (Consider the amount of support and encouragement necessary to keep the client involved in treatment)     He gave verbal compliance to abstaining from all mood altering chemicals but has lacked consistent behavior to support abstinence, including returning to using alcohol on a daily basis over the months from 3/2018 to 2018.  He reports being willing and ready to enter the tx program, but mainly due to his wife urging him to.  He showed up today for his intitial service appointment today      Dimension Five: Continued Use/Relaspe Prevention     Rating:  3  (Consider the degree to which the client's recognizes relapse issues and has the skills to prevent relapse of either substance use or mental health problems)     He denies ever having  treatment or detox before, but that he has had some periods of abstinence.  He said he wants to work on coping mechanisms and resolving emotions without alcohol     Dimension Six: Recovery Environment     Ratin   (Consider the degree to which key areas of the client's life " "are supportive of or antagonistic to treatment participation and recovery)   He reports he has been attending AA for approximately 9 months and has tried a variety of different meetings.  He also reports he has people in his life that are in Recovery and he talks with them regularly.  He reports significant relationship conflict with his wife, due to his use.  He reports most of his friends use alcohol,\" but it is not an issue\" and he has many supportive friends.  It may be conducive for him to find a sober peer network   He denies any legal issues. He denies any financial issues.      I have reviewed the information on the assessment, psychosocial and medical history and checklist:        it is current  "

## 2019-02-11 NOTE — PROGRESS NOTES
Patient Safety Plan Template    Name:   Nolan Mayo YOB: 1990 Age:  28 year old MR Number:  [id number]   Step 1: Warning signs (Thoughts, images, mood, situation, behavior) that a crisis may be developin. Thinking someone is not telling the truth     2. Incredulous behavior     3. Indignant behavior/selfish behavior     Step 2: Internal coping strategies - Things I can do to take my mind off of my problems without contacting another person (relaxation technique, physical activity):     1. Remove myself and provide physical release     2. Exercise     3. Serenity prayer     Step 3: People and social settings that provide distraction:     1. Name: Father   Phone: [phone]   2. Name: Wife   Phone: [phone]   3. Place:[facility]   4. Place: walks     The one thing that is most important to me and worth living for is: Family     Step 4: People whom I can ask for help:     1. Name: Father   Phone: [phone]     2. Name: Wife-Edyta   Phone:[phone]     3. Name: Onel   Phone: [phone]     Step 5: Professionals or agencies I can contact during a crisis:     1. Clinician Name: [facility]   Phone: [phone]   Clinician Pager or Emergency Contact #: [phone]     2. Clinician Name: Nadeen Marin. [id number] Phone: [phone]     Clinician Pager or Emergency Contact #: [phone]     3. Local Urgent Care Services: [facility] Rei Marin.    Urgent Care Services Address: [address]    Urgent Care Services Phone: [phone]     4. Suicide Prevention Lifeline Phone: 5-268-815-WHWU ([phone])     Step 6: Making the environment safe:     1. No alcohol in the home     2. When I have kids, that will help make it safe     Safety Plan Template 2008 Magda Rao and Brenton Sagastume is reprinted with the express permission of the authors.  No portion of the Safety Plan Template may be reproduced without the express, written permission.  You can  contact the authors at [email or web address] or [email or web address].

## 2019-02-11 NOTE — PROGRESS NOTES
Patient:  Nolan Mayo    Date: February 11, 2019    Comprehensive Assessment UPDATE        Comprehensive Summary Update and Review  Counselor met with patient on 2/11/19 and reviewed the Comprehensive Assessment.    There were no changes/updates identified by patient or in chart entries.

## 2019-02-11 NOTE — PROGRESS NOTES
[place]-Suicide Severity Rating Scale   Suicide Ideation   1.) Have you ever wished you were dead or that you could go to sleep and not wake up?     Lifetime:  No Past Month:  No   2.) Have you actually had any thoughts of killing yourself?   Lifetime:  No Past Month:  No   3.) Have you been thinking about how you might do this?     Lifetime:  No Past Month:  No   4.) Have you had these thoughts and had some intention of acting on them?     Lifetime:  No Past Month:  No   5.) Have you started to work out the details of how to kill yourself?   Lifetime:  No Past Month:  No   6.) Do you intend to carry out this plan?      Lifetime:  No Past Month:  No   Intensity of Ideation   Intensity of ideation (1 being least severe, 5 being most severe):     Lifetime:  The patient denied ever having any suicidal thoughts in life. Past Month:  The patient denied ever having any suicidal thoughts in life.   How often do you have these thoughts?  The patient denied ever having any suicidal thoughts in life.   When you have the thoughts how long do they last?  The patient denied ever having any suicidal thoughts in life.   Can you stop thinking about killing yourself or wanting to die if you want to?  The patient denied ever having any suicidal thoughts in life.   Are there things - anyone or anything (i.e. family, Yarsanism, pain of death) that stopped you from wanting to die or acting on thoughts of suicide?  Does not apply   What sort of reasons did you have for thinking about wanting to die or killing yourself (ie end pain, stop how you were feeling, get attention or reaction, revenge)?  Does not apply   Suicidal Behavior   (Suicide Attempt) - Have you made a suicide attempt?     Lifetime:  The patient had never made a suicide attempt. Past Month:  The patient had never made a suicide attempt.   Have you engaged in self-harm (non-suicidal self-injury)?  The patient denied having any history of engaging in self-harm (non-suicidal  self-injury).   (Interrupted Attempt) - Has there been a time when you started to do something to end your life but someone or something stopped you before you actually did anything?  No   (Aborted or Self-Interrupted Attempt) - Has there been a time when you started to do something to try to end your life but you stopped yourself before you actually did anything?  No   (Preparatory Acts of Behavior) - Have you taken any steps towards making suicide attempt or preparing to kill yourself (such as collecting pills, getting a gun, giving valuables away or writing a suicide note)?  No   Actual Lethality/Medical Damage:  The patient denied ever making a suicidal attempt.     2008  [employer] [facility] [place] [facility] [employer]  Used with permission by Shreya Lynch, PhD.       Guide to C-SSRS Risk Ratings   NO IDEATION:  with no active thoughts IDEATION: with a wish to die. IDEATION: with active thoughts. Risk Ratings   If Yes No No 0 - Very Low Risk   If NA Yes No 1 - Low Risk   If NA Yes Yes 2 - Low/moderate risk   IDEATION: associated thoughts of methods without intent or plan INTENT: Intent to follow through on suicide PLAN: Plan to follow through on suicide Risk Ratings cont...   If Yes No No 3 - Moderate Risk   If Yes Yes No 4 - High Risk   If Yes Yes Yes 5 - High Risk   The patient's ADDITIONAL RISK FACTORS and lack of PROTECTIVE FACTORS may increase their overall suicide risk ratings.     Additional Risk Factors:    Significant history of having untreated or poorly treated mental health symptoms     History of impulsive or aggressive behaviors   Protective Factors:    Having people in his/her life that would prevent the patient from considering a suicide attempt (i.e. young children, spouse, parents, etc.)     Having easy access to supportive family members     Having cultural, Restorationist or spiritual beliefs that discourage suicide     Risk Status   Past month:0. - Very Low Risk:  Evaluation Counselors:   "Document in Epic / SBAR to counselor \"Very Low Risk\".      Treatment Counselors:  Reassess upon admission as applicable, assess weekly in progress notes under Dimension 3 and summarize in Discharge / Treatment summary under Dimension 3.  Past 24 hours:0. - Very Low Risk:  Evaluation Counselors:  Document in Epic / SBAR to counselor \"Very Low Risk\".      Treatment Counselors:  Reassess upon admission as applicable, assess weekly in progress notes under Dimension 3 and summarize in Discharge / Treatment summary under Dimension 3.   Additional information to support suicide risk rating: There was no additional information to provide at this time.     "

## 2019-02-11 NOTE — PROGRESS NOTES
Initial Services Plan        Service Initiation Date: 2/11/2019    Immediate health and/or safety concerns: No    Identify health and safety concern(s) below and include plan to address:    None Identified    Client issues to be addressed in the first treatment sessions:     Fear of being in a group and/or speaking in front of people  Other: treatment apprehensions    Treatment suggestions for client during the time between intake (admit date) and completion of the individual treatment plan:     Tour the treatment center or outpatient clinic  Review your patient or client handbook    Completed by: SERJIO Deng  Date completed: 2/11/2019 at 10:47 AM

## 2019-02-12 ASSESSMENT — ANXIETY QUESTIONNAIRES: GAD7 TOTAL SCORE: 15

## 2019-02-12 NOTE — PROGRESS NOTES
"  CD ADULT Progress Note     Treatment Plan Review completed on:  2/14    Attendance Dates: 2/11, 13, 14    Total # of Group Sessions:  3 group sessions for 6 hours, plus 2 hour orientation/initial service/tx plan     MONDAY TUESDAY WEDNESDAY THURSDAY FRIDAY SATURDAY SUNDAY Total   Group Therapy 2  2 2       Specialty Groups*           1:1           Family Program           Merna   2          Phase II             Absent           Total 4  2 2    8     *Specialty Groups include Mental Health Care, Assertiveness and Communication, Sobriety Maintenance Skills, Spiritual Care, Stress Management, Relapse Prevention, Family Systems.                    Learning Style:  Hands on    Staff member contributing:  Bethany Mijares, MS, LADC, LPC  Marie Andersen, [facility]    Received supervision:  No    Client:  contributed to goals and plan    Did Client receive a copy of treatment plan/revised plan:  Yes    Changes to Treatment Plan:  Yes    Client agrees with plan/revised plan:  Yes    Any changes in Vulnerable Adult Status:  No    Substance Use Disorders:  None and Alcohol Use Disorder Severe (F10.20)       ) Care Coordination Activities:  talked with Marie Andersen about client progress  2) Medical, Mental Health and other appointments the client attended: no  3) Medication issues:  he reports \"no issues my Zoloft works fine\"  4) Physical and mental health problems:\"no\"   5) Review and evaluation of the individual abuse prevention plan: Does not apply        ASAM Risk Ratings and Data       DIMENSION 1: Acute Intoxication/Withdrawal  The client's ability to cope with withdrawal symptoms and current state of intoxication       Acute Intoxication/Withdrawal - Current Risk Factor:  0    Reporting sober date of 1/20    Goals:  Remain abstinent or report any use    Data:  See DEB      DIMENSION 2:  Biomedical Conditions and Complaints  The degree to which any physical disorder would interfere with treatment for substance abuse and " the client's ability to tolerate any related discomfort     Biomedical Conditions and Complaints - Current Risk Factor:  1    Goals: regular medical check up    Data:  See DEB      DIMENSION 3:  Emotional/Behavioral/Cognitive Conditions and Complications  The degree to which any condition or complications are likely to interfere with treatment for substance abuse or with function in significant life areas and the likelihood of risk of harm to self or others.     Emotional/Behavioral - Current Risk Factor:  2    DSM-5 Diagnoses:   per client anxiety disorder       Goals: Take pHQ-9 and JOSE L-7              Take medications as directed               Healthy coping for anxiety    Data:  See DEB      DIMENSION 4:  Readiness to Change  Consider the amount of support and encouragement necessary to keep the client involved in treatment.     Readiness to Change - Current Risk Factor:  2    Goals:  Continue to find internal motivation for change    Data:  On 2/11 client attended orientation/initial service meeting.  See all documents from this day for further information I) I met with client for 2 hours to discuss orientation, tx plan, safety plan and other required points of discussion.  I reiterated to client that he is 20 years younger than the youngest person in the group as a reminder to him.  A) client seems motivated for tx.  Client expressed he feels it will work well to be in a group with older adults.  Client seems very serious and also seems to have some guilt and shame.  P) will further discuss guild and shame and possible add to tx plan.       DIMENSION 5:  Relapse/Continued Use/Continued Problem Potential  Consider the degree to which the client recognizes relapse issues and has the skills to prevent relapse of either substance use or mental health problems.     Relapse/Continued Use/Continued Problem Potential - Current Risk Factor:  3    Goals:  Plan for coping with and maintaining sobriety    Data:  See  DEB      DIMENSION 6:  Recovery Environment  Consider the degree to which key areas of the client's life are supportive of or antagonistic to treatment participation and recovery.     Recovery Environment - Current Risk Factor:  2    Support group attended this week:  No    Did family agree to attend family week:  No    If yes:  none schedule this week    Goals:  Build sober structure and support    Data:  See DEB       Goals and interventions wk of 2/11: initial service, dim 3  Bethany Mijares MS, LADC, LPC

## 2019-02-13 ENCOUNTER — HOSPITAL ENCOUNTER (OUTPATIENT)
Dept: BEHAVIORAL HEALTH | Facility: CLINIC | Age: 29
End: 2019-02-13
Attending: SOCIAL WORKER
Payer: COMMERCIAL

## 2019-02-13 PROCEDURE — H2035 A/D TX PROGRAM, PER HOUR: HCPCS

## 2019-02-13 PROCEDURE — H2035 A/D TX PROGRAM, PER HOUR: HCPCS | Mod: HQ

## 2019-02-14 ENCOUNTER — HOSPITAL ENCOUNTER (OUTPATIENT)
Dept: BEHAVIORAL HEALTH | Facility: CLINIC | Age: 29
End: 2019-02-14
Attending: SOCIAL WORKER
Payer: COMMERCIAL

## 2019-02-14 PROCEDURE — H2035 A/D TX PROGRAM, PER HOUR: HCPCS | Mod: HQ

## 2019-02-15 NOTE — PROGRESS NOTES
"Nolan Mayo  February 14, 2019  [id number]    [facility] Mental Health Process Group Note      Day and Date:  Thursday, 2/14/19        Number of participants:  2      Length of Group: 2 hours     Goal of the Group: Learn current neuroscience of addiction to better understand addiction-as-a-disease and to reduce confusion, shame and guilt leading to increased open-mindedness and psychological flexibility needed to build resilience. Clients learn what is meant by  retrain the brain.        [place]: Group Topics and Activities      I.  D3 Intervention and Group Topic addressed: Neuroscience and the importance of treatment     II. Mindfulness and/or Motivational Component:  Tao Edgar 18-minute talk on the power of addiction and the value of compassion.    III. Additional Activity: Group learned the different roles of various areas of the brain and how these are changed by addiction.  They learned how these changes manifest in behaviors and lead to consequences. They learned how to strengthen the frontal cortex to reduce the impact of cravings and  using  thoughts emanating from the midbrain.  Group compared different areas of addiction that society may condone and others that still have stigma attached to them.           IV. Review of Progress:   A. Client self-report: This was Nolan's, \"Thanh's\" first group with this therapist and he shared what brought him to treatment. His drug of choice is alcohol. He received his journal and agreed to do this daily.  He has done step work but is unfamiliar with mindfulness.  His stress is 3 now \"but it was a 9 with alcohol.\"  He has no cravings, \"urges only\" and he wanted to make sure I understood that he's here on his own and not just because his wife also wants him in treatment.  He has a sponsor, attends meetings and his father has 13 months of sobriety and is very supportive.        B. Therapist Assessment:   Thanh was quiet today but participated when prompted. He said he felt guilty " "because \"I should know all this and yet I still used.\"  He sees a therapist once a month and attends AA once a week.      V.   Reflection and evaluation of today s group experience:  Gave verbal feedback and filled out evaluation form. Client wrote the most important thing(s) learned today included \"Receptors of dopamine lower over time of use. Addiction can be any type of relief or distraction\" and \"Fully noticing the science but wanting to have fun anyway.\"     VI. Assignments or activities to do for next week: Continue to do journal before bed, do at least one mindfulness exercise in the day time, address one self-care area a week, incorporate one stress reduction technique into daily schedule of structure and routine.     Therapeutic techniques in this session:  Motivational Therapy, CBT/DBT/ACT, Supportive, Behavioral Modification and Mindfulness      Additional Treatment Referrals/Follow-up Issues to Address: Not needed at this time.      Change in Treatment Plan: No change. This session completes one Treatment Plan intervention in D3.     Change in Diagnosis: No change in diagnosis indicated.    "

## 2019-02-18 ENCOUNTER — HOSPITAL ENCOUNTER (OUTPATIENT)
Dept: BEHAVIORAL HEALTH | Facility: CLINIC | Age: 29
End: 2019-02-18
Attending: SOCIAL WORKER
Payer: COMMERCIAL

## 2019-02-18 PROCEDURE — H2035 A/D TX PROGRAM, PER HOUR: HCPCS | Mod: HQ

## 2019-02-19 NOTE — PROGRESS NOTES
"  CD ADULT Progress Note     Treatment Plan Review completed on:  2/21    Attendance Dates: 2/18,20,    Total # of Group Sessions:  5 group sessions for 10 hours, plus 2 hour orientation/initial service/tx plan 1/29 MONDAY TUESDAY WEDNESDAY THURSDAY FRIDAY SATURDAY SUNDAY Total   Group Therapy 2  2        Specialty Groups*           1:1 15 min          Family Program           [place]             Phase II             Absent           Total 2 h 15 min  2     4 hr 15 min     *Specialty Groups include Mental Health Care, Assertiveness and Communication, Sobriety Maintenance Skills, Spiritual Care, Stress Management, Relapse Prevention, Family Systems.                    Learning Style:  Hands on    Staff member contributing:  Bethany Mijares, MS, LADC, LPC  Marie Andersen, [facility]    Received supervision:  No    Client:  contributed to goals and plan    Did Client receive a copy of treatment plan/revised plan:  Yes    Changes to Treatment Plan:  Yes    Client agrees with plan/revised plan:  Yes    Any changes in Vulnerable Adult Status:  No    Substance Use Disorders:  None and Alcohol Use Disorder Severe (F10.20)       ) Care Coordination Activities:  talked with Marie Andersen about client progress and a discussion we had 1:1  2) Medical, Mental Health and other appointments the client attended: He reports attending therapy on 2/18  3) Medication issues:  he reports \"no issues my Zoloft works fine\"  4) Physical and mental health problems:\"no\"   5) Review and evaluation of the individual abuse prevention plan: Does not apply        ASA Risk Ratings and Data       DIMENSION 1: Acute Intoxication/Withdrawal  The client's ability to cope with withdrawal symptoms and current state of intoxication       Acute Intoxication/Withdrawal - Current Risk Factor:  0    Reporting sober date of 1/20    Goals:  Remain abstinent or report any use    Data:  No goals worked on in this dimension. On 2/18 He reports last use as " "1/20/19      DIMENSION 2:  Biomedical Conditions and Complaints  The degree to which any physical disorder would interfere with treatment for substance abuse and the client's ability to tolerate any related discomfort     Biomedical Conditions and Complaints - Current Risk Factor:  1    Goals: regular medical check up    Data:  No goals worked on in this dimension.  On 2/18 He reports \"no issues\"      DIMENSION 3:  Emotional/Behavioral/Cognitive Conditions and Complications  The degree to which any condition or complications are likely to interfere with treatment for substance abuse or with function in significant life areas and the likelihood of risk of harm to self or others.     Emotional/Behavioral - Current Risk Factor:  2    DSM-5 Diagnoses:   per client anxiety disorder       Goals: Take pHQ-9 and JOSE L-7              Take medications as directed               Healthy coping for anxiety    Data: On 2/18 he reported he saw his therapist previous to attending group.  He said therapy is helpful.  I) asked him what he finds helpful and if he discussed his anger towards his wife and what he feels is her \"patronizing\"  I encouraged him to consider therapy with his wife, but to discuss with his therapist.  I told him I was concerned about his anger, expressing it appropriately, but also that he is not internalizing it and finds helpful tools to cope. A) At assessment he said he wants to learn to deal with feelings without fighting and has goals on tx plan related to this. He seems to be aware and also working on \"being appropriate\".  P) he will continue to talk with therapist about his feelings, he is going to consider family group and/or therapy with wife. Continue to       DIMENSION 4:  Readiness to Change  Consider the amount of support and encouragement necessary to keep the client involved in treatment.     Readiness to Change - Current Risk Factor:  2    Goals:  Continue to find internal motivation for " "change    Data: On 2/18 client checked in reporting feelings of being \"patronized\" in group.\" I don't want things repeated and I want to get to the therapy\". He also said he is feeling that way at home, as his wife continues to be nice and kind to \"her drug using relatives, but treats me differently\".  I) On 2/18, in group I gave client and group members the dates and times of the next family sessions.  Later, after client reported feeling patronizes, I asked client to explain his feelings when he is patronized and he said he feels put down by his wife and in tx he \"has so much inside that he doesn't want to waste time talking about logistics. I asked him if he had talked with his therapist about his feelings regarding his wife, as that seems like it would be a good place to talk and get the 1:1 for 45 min or more.  I asked him what he would like from his wife and he wasn't sure.  I explained that the family group, that I explained earlier, could be a helpful place for them.   I also explained that handing out resources and talking about times/dates of family group are part of the group time, for the entire group, and would be in the future and clients need to hear the information.  He wanted to move on and we did, but at the end of group, I asked Thanh to meet  because he seemed angry and I also wanted to continue the discussion not taking up more group time.  I asked him what he needed from this counselor, reminded him that his therapy is a good place to deal with home issues, but we could also do that 1:1.  I also told him he can bring his wife in and talk with me if he chooses.  I told him he can do a boundary assignment and we will discuss it in group.  At this time he apologized for being condescending and he said he did not like his behavior.  He also complimented this counselor on some therapeutic techniques.  I asked him to think of how he can be more respectful, in dealing with his feelings the next time " something he doesn't like  happens in group. I also reminded Thanh that at assessment, by Woodrow Membreno, and orientation/initial service, with me, he was talked to about the fact that everyone in this group was 2x or more his age, some are very sick or may have some cognitive issues  and some things in group have to be repeated more or done at a different pace, depending on who is in the room.  A) Thanh seemed angry in group, but after 1:1 seemed to have a better understanding, his apology seemed sincere.  I am concerned about his anger and will continue to monitor. He does seem to have good awareness, but has been a heavy drinker for a long time, so is likely being confronted with his feelings, now without using.  He can use ongoing discussion and helpful coping tools.   He seems motivated for change and aware of some of his old thinking and behaviors that do not serve him.  P) client will continue to talk with his therapist about feelings/issues related to his wife.  Thanh is going to think about having his wife meet with therapist and he.  I will call his therapist, but Thanh does not want me to until he has had a chance to process some of this.  He and I will talk about this again.  He will do boundary assignment and participate in discussion.     On 2/11 client attended orientation/initial service meeting.  See all documents from this day for further information I) I met with client for 2 hours to discuss orientation, tx plan, safety plan and other required points of discussion.  I reiterated to client that he is 20 years younger than the youngest person in the group as a reminder to him.  A) client seems motivated for tx.  Client expressed he feels it will work well to be in a group with older adults.  Client seems very serious and also seems to have some guilt and shame.  P) will further discuss guild and shame and possible add to tx plan.       DIMENSION 5:  Relapse/Continued Use/Continued Problem Potential  Consider  the degree to which the client recognizes relapse issues and has the skills to prevent relapse of either substance use or mental health problems.     Relapse/Continued Use/Continued Problem Potential - Current Risk Factor:  3    Goals:  Plan for coping with and maintaining sobriety    Data:  No goals worked on in this dimension.  On 2/18 he reported that complacency can be a trigger, but he is using deep breathing to help with coping, instead of using alcohol.       DIMENSION 6:  Recovery Environment  Consider the degree to which key areas of the client's life are supportive of or antagonistic to treatment participation and recovery.     Recovery Environment - Current Risk Factor:  2    Support group attended this week:  No    Did family agree to attend family week:  No    If yes:  none schedule this week    Goals:  Build sober structure and support    Data:  No goals worked on in this dimension. On 2/20 he reports AA attendance is helpful       Goals and interventions wk of 2/18: boundary and anger discussion 1/1 2/11: initial service, dim 3  Bethany Mijares, MS, LADC, LPC

## 2019-02-27 ENCOUNTER — HOSPITAL ENCOUNTER (OUTPATIENT)
Dept: BEHAVIORAL HEALTH | Facility: CLINIC | Age: 29
End: 2019-02-27
Attending: SOCIAL WORKER
Payer: COMMERCIAL

## 2019-02-27 PROCEDURE — H2035 A/D TX PROGRAM, PER HOUR: HCPCS | Mod: HQ

## 2019-02-28 ENCOUNTER — HOSPITAL ENCOUNTER (OUTPATIENT)
Dept: BEHAVIORAL HEALTH | Facility: CLINIC | Age: 29
End: 2019-02-28
Attending: SOCIAL WORKER
Payer: COMMERCIAL

## 2019-02-28 PROCEDURE — H2035 A/D TX PROGRAM, PER HOUR: HCPCS | Mod: HQ

## 2019-02-28 NOTE — PROGRESS NOTES
"Nolan Mayo  February 28, 2019  [id number]    [facility] Mental Health Process Group Note      Day and Date:  Thursday, 2/28/19    Number of participants: 2     Length of Group:  2 hours    Goal of the Group: Clients learn key concepts of traditional CBT (cognitive distortions, conditioning, automatic thoughts, reframing) and then build on these by learning three core concepts of third wave therapies (ACT, DBT, MB): Acceptance, Cognitive Defusion and Being Present. The objective is to increase psychological flexibility and choose behaviors that serve the clients  personal values.      Rating scales are 1-10, with 1 being low and 10 being high or most severe.     [place]: Group Topics and Activities     I.  D3 Intervention and Group Topic addressed: Part 1:  3 Core processes to increase psychological flexibility and increase resiliency for mental and physical health.     II. Mindfulness and/or Motivational Component: Getting Comfortable with Moods, Exploring Affirmations to use this week, Liquid Mood meditation     III. Additional Activity:  Three Simple Steps to Acceptance; Using Affirmations to increase Self-Acceptance; Cognitive Defusion exercises; Visualization for defusion of thoughts and emotions     IV. Review of Progress:   A. Client's self-report: Thanh reported that he and his wife had a wonderful trip to [place] and they stayed in a friend's van/camper, so it didn't cost them much.  He will start his journal as he forgot to do it last week. He rated his stress at 2 and said he still has cravings but they are more like urges and thoughts. He said, \"Knowing I can't get away with it and it's not fun but will make me sad and depressed helps.\" He also said that looking forward to bowling 4 times a week helps as well as he usually has these thoughts when around 4:00 PM when he used to have a drink. We spoke of creating a new ritual or something to look forward to coming home to each night.     B. Therapist's " "Assessment: Thanh participates well and he had a brighter affect than the last time I saw him in group. He said he always had a hard time with \"It's not good enough, there's always room for improvement\" so he wanted to work on self-acceptance more.   Favorite Affirmation from today: \"When I foolishly put myself down, I don't have to put myself down for putting myself down.\"     V.   Reflection and evaluation of today s group experience:  Gave verbal feedback during group and completed the evaluation. Comments included: The most important thing learned today was \"Ability to observe thoughts and emotions. Acceptance\" and \"I struggle with acceptance. It leads to self-imprisonment\" and \"Talk about the difference between defusion and repression.\"     VI. Assignments or activities to do for next week: Complete the journal and practice mindfulness daily. Try to use cognitive defusion throughout the week on negative thoughts and emotions.    Therapeutic techniques in this session:  Motivational Therapy, CBT/DBT/ACT, Supportive and Mindfulness    Additional Treatment Referrals/Follow-up Issues to Address: Not indicated at this time.    Change in Treatment Plan: Not indicated at this time. This group completes one of the Tx Plan interventions under D3.      Change in Diagnosis: No changes at this time.    "

## 2019-03-04 ENCOUNTER — HOSPITAL ENCOUNTER (OUTPATIENT)
Dept: BEHAVIORAL HEALTH | Facility: CLINIC | Age: 29
End: 2019-03-04
Attending: SOCIAL WORKER
Payer: COMMERCIAL

## 2019-03-04 PROCEDURE — H2035 A/D TX PROGRAM, PER HOUR: HCPCS | Mod: HQ

## 2019-03-05 NOTE — PROGRESS NOTES
"  CD ADULT Progress Note     Treatment Plan Review completed on:  3/7    Attendance Dates: 3/4 ,5 and 6 for 6 hours    Total # of Group Sessions:  9 group sessions for 118 hours, plus 2 hour orientation/initial service/tx plan 1/29 MONDAY TUESDAY WEDNESDAY THURSDAY FRIDAY SATURDAY SUNDAY Total   Group Therapy 2  2 2    6   Specialty Groups*           1:1           Family Program           [place]             Phase II             Absent           Total 2  2 2    6     *Specialty Groups include Mental Health Care, Assertiveness and Communication, Sobriety Maintenance Skills, Spiritual Care, Stress Management, Relapse Prevention, Family Systems.                    Learning Style:  Hands on    Staff member contributing:  Bethany Mijares, MS, LADC, LPC  Marie Andersen, Dorothea Dix Psychiatric CenterSW  Fabi Giles, Intern    Received supervision:  No    Client:  contributed to goals and plan    Did Client receive a copy of treatment plan/revised plan:  Yes    Changes to Treatment Plan:  no    Client agrees with plan/revised plan:  Yes    Any changes in Vulnerable Adult Status:  No    Substance Use Disorders:  None and Alcohol Use Disorder Severe (F10.20)       ) Care Coordination Activities:  talked with Marie Andersen about client progress.  2) Medical, Mental Health and other appointments the client attended: \"none\"  3) Medication issues:  he reports the  Doubles his  Zoloft  4) Physical and mental health problems:\"no\"   5) Review and evaluation of the individual abuse prevention plan: Does not apply        [facility] Risk Ratings and Data       DIMENSION 1: Acute Intoxication/Withdrawal  The client's ability to cope with withdrawal symptoms and current state of intoxication       Acute Intoxication/Withdrawal - Current Risk Factor:  0    Reporting sober date of 1/20    Goals:  Remain abstinent or report any use    Data:  No goals worked on in this dimension.   On 3/4 He reports last use as 1/20/19      DIMENSION 2:  Biomedical " "Conditions and Complaints  The degree to which any physical disorder would interfere with treatment for substance abuse and the client's ability to tolerate any related discomfort     Biomedical Conditions and Complaints - Current Risk Factor:  1    Goals: regular medical check up    Data:  No goals worked on in this dimension.  On 3/4 he  Reports the  Doubled his Zolft medication      DIMENSION 3:  Emotional/Behavioral/Cognitive Conditions and Complications  The degree to which any condition or complications are likely to interfere with treatment for substance abuse or with function in significant life areas and the likelihood of risk of harm to self or others.     Emotional/Behavioral - Current Risk Factor:  2    DSM-5 Diagnoses:   per client anxiety disorder       Goals: Take pHQ-9 and JOSE L-7              Take medications as directed               Healthy coping for anxiety    Data: No goals worked on in this dimension.   On 3/4 He reports \"no issues\" but  Doubled my Zoloft and I think that will be good.  He reports has an appointment with his therapist and psychiatrist scheduled for March 25th.  On 3/6 I asked Thanh for an AUGUSTINE for his psychiatrist and his therapist, he signed for the psychiatrist, but said he has not told his therapist about his treatment and right now does not want to because \"we are working on the Steps and I am on 5 and we will have to go back to 1, which we are already doing in tx\".  \"I don't want to go back because it is too boring and my 4th and 5th step have been good\".  I) I told Thanh to consider telling his therapist and that I would give him time.  I also said he does not ultimately know what his therapist would or would not do if he talks about being in tx.  I also told Thanh  I would need to staff this with Clinical superviser.  A) Because Thanh is working on his anger and seems to be making strides, I am meeting him where he is at at this time.  Further discussion is needed, but he " "seems sincere in willingness to consider what we talked about and discuss again.  P) THis counselor will staff the situation, will call his psychiatrist as a check in on medications and concerns and I will talk with Thanh again in 2 weeks, unless he wishes to discuss prior to that       DIMENSION 4:  Readiness to Change  Consider the amount of support and encouragement necessary to keep the client involved in treatment.     Readiness to Change - Current Risk Factor:  2    Goals:  Continue to find internal motivation for change    Data: No goals worked on in this dimension.  On 3/4 he reported his motivation for sobriety is 7 and for community support is 8         DIMENSION 5:  Relapse/Continued Use/Continued Problem Potential  Consider the degree to which the client recognizes relapse issues and has the skills to prevent relapse of either substance use or mental health problems.     Relapse/Continued Use/Continued Problem Potential - Current Risk Factor:  3    Goals:  Plan for coping with and maintaining sobriety    Data:  On 3/6 he He partcipated in viewing and discussion \"Pleasure Unwoven\".  This explored the arguments for and against the debate about if addiction is really a disease.  Client said knows a lot of the \"brain science\", but this added to his knowledge.He  Said he liked hearing what other group members learned as it reinforced information.  He said it helps him know some more facts about this being a disease, but that doesn't take away one's own responsibility or ways to make good choices  I) on 3/6 I talked, in depth, with client about the latest neuroscientific research about addiction found in \"Pleasure Unwoven\".  We examined the video presentors \"choice\" argument and \"disease\" argument about addiction. I emphasized that all of the information can provide help in understanding chemical dependency, but also that one need not get caught up too much in trying to make the head make sense of everything.  I " explained that self exploration and research make for a good program. A) Client seemed to like the video's well-thought out and demonstrated images.  He paritcipated in the discussion and liked this.  He verbalized understanding of video concepts.  He seems to have found insight in today's presentation.  A strength is his willingness to participate and ask questions.       On 2/27:  D- Thanh reported that his cravings level for the past week was a 5 out of 10 with 1= very little and 10=high. Thanh reported that he was on a trip with his wife visiting his best friend in the . Thanh reported that one night during the trip, him and his friend had planned to go out. Thanh reported that he was confronted by his friend's friends about what he wanted to drink and began to feel uncomfortable. Thanh made the decision to not go out with his friend that evening to maintain his sobriety.   I- Discussion with Thanh and encouragement on his decision to step away from the situation to maintain his sobriety. Thanh received feedback and reflected his appreciated for the feedback.  A- Thanh appeared to be managing his triggers with the support of his friend and wife on their trip. Thanh was able to recognize the situation as a trigger and remove himself from the situation.  P- Thanh will continue to attend group three times per week, until the mental health component is completed. Thanh will continue to share his trigger ratings and situations as they arise.      DIMENSION 6:  Recovery Environment  Consider the degree to which key areas of the client's life are supportive of or antagonistic to treatment participation and recovery.     Recovery Environment - Current Risk Factor:  2    Support group attended this week:  No    Did family agree to attend family week:  No    If yes:  none schedule this week    Goals:  Build sober structure and support    Data:  No goals worked on in this dimension. On 3/4 he reported that he needs to be patient with  the process of treatment and recovery.  Thanh also reports that he attends AA on Saturdays and has been in contact with his sponsor and other supportive people in his life.       Goals and interventions wk of 3/4: Pleasure Unwoven   2/18: boundary and anger discussion 1/1 2/11: initial service, dim 3  Bethany Mijares MS, LADC, LPC

## 2019-03-06 ENCOUNTER — HOSPITAL ENCOUNTER (OUTPATIENT)
Dept: BEHAVIORAL HEALTH | Facility: CLINIC | Age: 29
End: 2019-03-06
Attending: SOCIAL WORKER
Payer: COMMERCIAL

## 2019-03-06 PROCEDURE — H2035 A/D TX PROGRAM, PER HOUR: HCPCS | Mod: HQ

## 2019-03-07 ENCOUNTER — HOSPITAL ENCOUNTER (OUTPATIENT)
Dept: BEHAVIORAL HEALTH | Facility: CLINIC | Age: 29
End: 2019-03-07
Attending: SOCIAL WORKER
Payer: COMMERCIAL

## 2019-03-07 PROCEDURE — H2035 A/D TX PROGRAM, PER HOUR: HCPCS

## 2019-03-07 NOTE — ADDENDUM NOTE
Encounter addended by: Bethany Mijares LADC on: 3/7/2019 8:33 AM   Actions taken: Sign clinical note

## 2019-03-07 NOTE — PROGRESS NOTES
"Nolan Mayo  March 7, 2019  [id number]      Progress Note for Individual Session:    Day and Date: Thursday, 3/07/19     Number of participants:  1     Length of Group: This is usually a 3 hour group session but this client was the only one who was able to attend today. Therefore, we had an individual session from 12:00 PM - 1:00 PM lieu of group. We covered the main topics that would have been covered in the group format.      Goal of the Group: Clients learn key concepts of traditional CBT (cognitive distortions, conditioning, automatic thoughts, reframing) and then build on these by learning three core concepts of third wave therapies (ACT, DBT, MB): Self-as-Context, Values and Committed Action. The objective is to increase psychological flexibility and choose behaviors that serve the clients  personal values.        Rating scales are 1-10, with 1 being low and 10 being high or most severe.     Motivational Activity:  Completed examples of  auto-  behavior vs. values based actions, completed the AAQ-II scale; identified personal value to work on this week +  create a  goal that reflects this value + commit to one step toward that goal for this week.       IV. Review of Progress:   A. Client's self-report: Thanh reported he is doing well and continuing to do his journal. His sponsor practices mindfulness and he is learning a lot of that from him.  His stress is 3-4, mainly over his father prolonging a surgery so that he can be at a family vacation.  He has cravings at times \"only when I'm driving\" as this reminds him of driving to or from a liquor store. His affect has been brighter the last 2 times he has been in group.     B. Therapist's Assessment:   Thanh participated fully in the session today.  He said he has felt \"happier\" and can let things go more since he has started the Zoloft, this is his 7th week and it took 20 days for him to feel a difference in his mood/anxiety. He had written a couple of things " "on his evaluation last week that he wanted to learn more about. One was about his \"struggle with acceptance\" and how he feels \"it leads to self-imprisonment.\" He feels that his anxiety has always been a motivation to do better and he has a fear of failure that overreaches into many aspects of his life. He is very competitive and enjoys winning and it is hard for him to fail at anything. He said \"I am an only child, so I'm like an every child, the best and worst.\" His last relapse after his marriage created a feeling of failure, especially since this put their financial plans on hold. They are working toward his wife being able to stop working so they can start a family and now this has been moved back due to \"My spending $8,000.00 on drinking in 14 months\" he figured this as $20.00 a day.  He also wanted to understand the difference between cognitive defusion and repression which was explained to him today.   The value Thanh chose to work on this week is Ingegrity and the goal that reflects this value is \"When I do a job or chore, do it to the best of my ability, be aware of the task at hand and not do it hastily and don't procrastinate.\" The step toward this goal for this week is \"Get ready for the family coming over and do the laundry and  the dog poop in the yard.\"  He said that being sober helps him stay focused on tasks and then they are more enjoyable and they don't seem so tedious or monotonous.\"      V.   Reflection and evaluation of today s group experience:  Gave verbal feedback and filled out evaluation form. The most important thing learned today was \"My competitive nature can be unhealthy\" and \"just need to take action\" and he was surprised that \"acceptance is a broad term\" as he was applying this term only to \"failures.\"    VI. Assignments or activities to do for next week: Continue to do the journal and mindfulness, practice cognitive defusion, recognize the \"observing self\" and notice what " values are reflected in daily choices and decisions.  Follow through on your STEP 1 from today s value s work.     Therapeutic techniques in this session:  Motivational Therapy, CBT/DBT/ACT, Supportive, Behavioral Modification and Mindfulness     Additional Treatment Referrals/Follow-up Issues to Address: Not indicated at this time.      Change in Treatment Plan: Not indicated at this time. This session completes one Tx Plan intervention under D3.       Change in Diagnosis: Not at this time.

## 2019-03-11 ENCOUNTER — HOSPITAL ENCOUNTER (OUTPATIENT)
Dept: BEHAVIORAL HEALTH | Facility: CLINIC | Age: 29
End: 2019-03-11
Attending: SOCIAL WORKER
Payer: COMMERCIAL

## 2019-03-11 PROCEDURE — H2035 A/D TX PROGRAM, PER HOUR: HCPCS | Mod: HQ

## 2019-03-13 ENCOUNTER — HOSPITAL ENCOUNTER (OUTPATIENT)
Dept: BEHAVIORAL HEALTH | Facility: CLINIC | Age: 29
End: 2019-03-13
Attending: SOCIAL WORKER
Payer: COMMERCIAL

## 2019-03-13 PROCEDURE — H2035 A/D TX PROGRAM, PER HOUR: HCPCS | Mod: HQ

## 2019-03-13 NOTE — PROGRESS NOTES
"  CD ADULT Progress Note     Treatment Plan Review completed on:  3/14     Attendance Dates: 3/11 ,13 and 14 for 6 hours   correction from last week, client only met with Marie Andersen for 1 hour    Total # of Group Sessions:  11 group sessions for 21  hours, plus 2 hour orientation/initial service/tx plan 1/29 MONDAY TUESDAY WEDNESDAY THURSDAY FRIDAY SATURDAY SUNDAY Total   Group Therapy 2  2 2    6   Specialty Groups*           1:1           Family Program           [place]             Phase II             Absent           Total 2  2 2    6     *Specialty Groups include Mental Health Care, Assertiveness and Communication, Sobriety Maintenance Skills, Spiritual Care, Stress Management, Relapse Prevention, Family Systems.                    Learning Style:  Hands on    Staff member contributing:  Bethany Mijares, MS, LADC, LPC  Marie Andersen, [facility] Giles, Intern    Received supervision:  No    Client:  contributed to goals and plan    Did Client receive a copy of treatment plan/revised plan:  Yes    Changes to Treatment Plan:  no    Client agrees with plan/revised plan:  Yes    Any changes in Vulnerable Adult Status:  No    Substance Use Disorders:  None and Alcohol Use Disorder Severe (F10.20)       ) Care Coordination Activities:  Left a message for Bonita Bonilla/[place], as she is prescriber for [place]' Zoloft.  I left contact information and told her I have no concerns but she can call if she does now or at any time.  I talked with Marie Andersen about client progress.  2) Medical, Mental Health and other appointments the client attended: \"none this week\"  3) Medication issues:  he reports that since doubling Zoloft, he feels it works better  4) Physical and mental health problems:\"no\"   5) Review and evaluation of the individual abuse prevention plan: Does not apply        ASAM Risk Ratings and Data       DIMENSION 1: Acute Intoxication/Withdrawal  The client's ability to cope with withdrawal " "symptoms and current state of intoxication       Acute Intoxication/Withdrawal - Current Risk Factor:  0    Reporting sober date of 1/20    Goals:  Remain abstinent or report any use    Data:  No goals worked on in this dimension.   On 3/13 He reports last use changed to 3/12  He said he had no withdrawal, other than shakes, and that he is aware of Withdrawal protocal and how to get help if needed.  I) On 3/11 I talked to him about withdrawal protocal, calling 911 or getting to ER if needed.  A) Thanh verbalizes understanding of the process.  P) He reports he will call 911 or go to ER if he is \"ever concerned\" about withdrwal safety.      DIMENSION 2:  Biomedical Conditions and Complaints  The degree to which any physical disorder would interfere with treatment for substance abuse and the client's ability to tolerate any related discomfort     Biomedical Conditions and Complaints - Current Risk Factor:  1    Goals: regular medical check up    Data:  No goals worked on in this dimension.    on 3/11 On 3/4 he  Reported that  the  Doubled his Zolft medication and Thanh feels that works better for him.      DIMENSION 3:  Emotional/Behavioral/Cognitive Conditions and Complications  The degree to which any condition or complications are likely to interfere with treatment for substance abuse or with function in significant life areas and the likelihood of risk of harm to self or others.     Emotional/Behavioral - Current Risk Factor:  2    DSM-5 Diagnoses:   per client anxiety disorder       Goals: Take pHQ-9 and JOSE L-7              Take medications as directed               Healthy coping for anxiety    Data: No goals worked on in this dimension. On 3/13 client reports 3 on anxiety, 1 on depression and 0 on stress(on likert scale where 0= none and 10= high).  D) on 3/6 Htanh signes a AUGUSTINE for Bonita [facility]illa at [facility].  He said she is Nurse practioner that prescribes Zoloft.  I) on 3/13 I called Bonita Bonilla  Left a message saying Thanh " "reports he feels it has helped to double the Zoloft.  I left contact information and told her I have no concerns but she can call if she does now or at any time.     On 3/4 He reports \"no issues\" but  Doubled my Zoloft and I think that will be good.  He reports has an appointment with his therapist and psychiatrist scheduled for March 25th.  On 3/6 I asked Thanh for an AUGUSTINE for his psychiatrist and his therapist, he signed for the psychiatrist, but said he has not told his therapist about his treatment and right now does not want to because \"we are working on the Steps and I am on 5 and we will have to go back to 1, which we are already doing in tx\".  \"I don't want to go back because it is too boring and my 4th and 5th step have been good\".  I) I told Thanh to consider telling his therapist and that I would give him time.  I also said he does not ultimately know what his therapist would or would not do if he talks about being in tx.  I also told Thanh  I would need to staff this with Clinical superviser.  A) Because Thanh is working on his anger and seems to be making strides, I am meeting him where he is at at this time.  Further discussion is needed, but he seems sincere in willingness to consider what we talked about and discuss again.  P) THis counselor will staff the situation, will call his psychiatrist as a check in on medications and concerns and I will talk with Thanh again in 2 weeks, unless he wishes to discuss prior to that       DIMENSION 4:  Readiness to Change  Consider the amount of support and encouragement necessary to keep the client involved in treatment.     Readiness to Change - Current Risk Factor:  2    Goals:  Continue to find internal motivation for change    Data: No goals worked on in this dimension.  On 3/4 he reported his motivation for sobriety is 8 and for community support is 9         DIMENSION 5:  Relapse/Continued Use/Continued Problem Potential  Consider the degree to which the client " "recognizes relapse issues and has the skills to prevent relapse of either substance use or mental health problems.     Relapse/Continued Use/Continued Problem Potential - Current Risk Factor:  3    Goals:  Plan for coping with and maintaining sobriety    Data: On 3/11 Thanh discussed that he had bowling that evening and evening of 3/12, he enjoys it, so it is why he cannot attend Family group on 3/18 and 3/25.  On 3/3 he reported he had used on 3/12.  He said he knew he would \"come clean\" even when he was using.  He said his wife thinks he should do [place].  Thanh discussed that as a plan, as well as the support he will use.  He wants to do what is best and wants some time to decide.  I) I talked with Thanh about his support and decision making strategy.  I told him we can discuss this again on phone or 3/18, whichever he sees fit to do.  I will talk with him again about signing a release for his therapist, now that he has used again, I told him I think it is important he talk to her about his use.  A) He seems to be progressing on his choices, and making more healty decisions.  Even though he used, he reported it, he seemed more open to feedback and he seems to be growing in esteem for self. P) Discuss further plans next week, after he has had time to process.     On 3/6 he He partcipated in viewing and discussion \"Pleasure Unwoven\".  This explored the arguments for and against the debate about if addiction is really a disease.  Client said knows a lot of the \"brain science\", but this added to his knowledge.He  Said he liked hearing what other group members learned as it reinforced information.  He said it helps him know some more facts about this being a disease, but that doesn't take away one's own responsibility or ways to make good choices  I) on 3/6 I talked, in depth, with client about the latest neuroscientific research about addiction found in \"Pleasure Unwoven\".  We examined the video presentors \"choice\" " "argument and \"disease\" argument about addiction. I emphasized that all of the information can provide help in understanding chemical dependency, but also that one need not get caught up too much in trying to make the head make sense of everything.  I explained that self exploration and research make for a good program. A) Client seemed to like the video's well-thought out and demonstrated images.  He paritcipated in the discussion and liked this.  He verbalized understanding of video concepts.  He seems to have found insight in today's presentation.  A strength is his willingness to participate and ask questions.           DIMENSION 6:  Recovery Environment  Consider the degree to which key areas of the client's life are supportive of or antagonistic to treatment participation and recovery.     Recovery Environment - Current Risk Factor:  2    Support group attended this week:  No    Did family agree to attend family week:  No    If yes:  none schedule this week    Goals:  Build sober structure and support    Data:  On 3/11 Thanh received Non - AA resources.  He reports he likes AA and the way the format is, but that having other resources may come in handy.  I) on 3/11 I gave him the resources, discussed the generalities of the resources and encouraged ongoing awareness of what is available for support A) Thanh seems happy with his AA, but open to awareness of other support, should he ever find the need for change.  P) keep resources in folder.    . On 3/4 he reported that he needs to be patient with the process of treatment and recovery.  Thanh also reports that he attends AA on Saturdays and has been in contact with his sponsor and other supportive people in his life.       Goals and interventions wk of 3/11, contacted Bonita Bonilla, [place] assignment, Non-AA resources, Use episode  3/4: Pleasure Unwoven   2/18: boundary and anger discussion 1/1 2/11: initial service, dim 3  Bethany Mijares, MS, LADC, LPC    "

## 2019-03-14 ENCOUNTER — HOSPITAL ENCOUNTER (OUTPATIENT)
Dept: BEHAVIORAL HEALTH | Facility: CLINIC | Age: 29
End: 2019-03-14
Attending: SOCIAL WORKER
Payer: COMMERCIAL

## 2019-03-14 PROCEDURE — H2035 A/D TX PROGRAM, PER HOUR: HCPCS | Mod: HQ

## 2019-03-14 NOTE — PROGRESS NOTES
"Nolan Mayo  March 14, 2019  [id number]    [facility] Mental Health Process Group Note    Day and Date and Time of Service:  Thursday 3/14/19      Number of participants:  2      Length of Group:  2 hours      Goal of the Group: Learn aspects of self-care to increase self-empowerment and reduce the impact of post-acute withdrawal symptoms. Learn how self-care can lead to clearer thinking and increased physical and mental resiliency.      [place]: Group Topics and Activities      I.  D3 Intervention and Group Topic addressed: Self-Care and Self-Compassion; Self-Care quiz and goals for next week     II. Mindfulness and/or Motivational Component: Self-Care Evaluation, Worksheet to prioritize needs, Laughter as Self-Care     III. Additional Activity: Completed a self-care evaluation and then prioritized what they felt was lacking and identified what aspect of self-care to address first. Then, developed steps to start this week to improve this area and be more proactive in their own healthcare routine.  The group also learned how Laughter is beneficial to our health.                       IV. Review of Progress:   A. Client s self-report:  Thanh said, \"I haven't been doing well.\" He described his use episode on Tues when he drank a pint of [id number] proof and then passed out within a half hour. He feels very guilty but has learned many lessons from this episode. He feels very supportive of his family, except his wife, whom he feels wants him to have more severe consequences.  He went back and described what led up to this with a sprained arm and then his father had a harm time with how the game was going and \"acted like a child.\" He said his father's actions were worse than losing the game.  His father also stopped the game when Thanh could still have won it for them with a strike.  They are now not going to play in the championship. He and his father are very intense about competition and sports. He told his father, \"You care too " "much\" and said, \"This is just what we were talking about last week.\"  He continues to do his journal and was mindful about his setback as \"I learned what didn't work.\"  His stress is better than yesterday but he feels uneasy about his wife's attitude toward this episode.  He is not having cravings and said, \"I'm more motivated\" and \"being honest about this has made all the difference.\"        B. Therapist s Assessment:   Thanh has opened up quite a bit since his first session. He is honest about the challenges he faces and could recite all the things he could have done \"but didn't.\" Examples he recited: \"Calling my sponsor instead of my dad when I was angry, being able to sit at the bowling ally until I was calmer, shaking the other team's hand, I did none of that.\"  He described his honesty further by explaining the difference in how he would have acted, \"I would hide it, I wouldn't come clean\" but this time, \"I woke up feeling horrible and knew immediately I would come clean.\"  He said the real difference is, \"I think I'm worth it now.\"  The self-care area to address this week:   Nutrition   The first step to work on this week: Do mindful eating and mindful food prep and drink less caffeine. He also is going to start exercising differently as he can't weight lift with a sprained arm. He will walk the dog more.               V.   Reflection and evaluation of today s group experience:  Gave verbal feedback and filled out evaluation form. The most important thing learned today was \"Self-Care can help with all aspects of life\" and \"drinking prevented a lot of it [self-care]\" and \"How important sleep is.\"        VI. Assignments or activities to do for next week: Continue to complete the daily journal before bed, do at least one mindfulness exercise a day, practice cognitive defusion for negative thoughts and emotions.   Follow the steps s/he created regarding the self-care identified as most urgent to address.     "   Therapeutic techniques in this session:  Motivational Therapy, Supportive, Behavioral Modification and Mindfulness      Additional Treatment Referrals/Follow-up Issues to Address: Follow-up is not indicated at this time.       Change Treatment Plan:  No, however, this group does fulfill one intervention under D3.       Change Diagnosis: No changes this week.

## 2019-03-18 ENCOUNTER — HOSPITAL ENCOUNTER (OUTPATIENT)
Dept: BEHAVIORAL HEALTH | Facility: CLINIC | Age: 29
End: 2019-03-18
Attending: SOCIAL WORKER
Payer: COMMERCIAL

## 2019-03-18 PROCEDURE — H2035 A/D TX PROGRAM, PER HOUR: HCPCS

## 2019-03-18 NOTE — PROGRESS NOTES
"  CD ADULT Progress Note     Treatment Plan Review completed on:  3/21     Attendance Dates: 3/18 (1:1) ,20 and 14 for 6 hours      Total # of Group Sessions:  13 group sessions for 25  hours, plus 2 hour orientation/initial service/tx plan 1/29  Plus 1:1 on 3/18 for 1 hour     MONDAY TUESDAY WEDNESDAY THURSDAY FRIDAY SATURDAY SUNDAY Total   Group Therapy   2 2       Specialty Groups*           1:1 1          Family Program           [place]             Phase II             Absent           Total 1  2 2    5     *Specialty Groups include Mental Health Care, Assertiveness and Communication, Sobriety Maintenance Skills, Spiritual Care, Stress Management, Relapse Prevention, Family Systems.                    Learning Style:  Hands on    Staff member contributing:  Bethany Mijares, MS, LADC, LPC  Marie Andersen, [facility]  Fabi Giles, Intern  Marli Schmidt, Clinical Supervisor    Received supervision:  No    Client:  contributed to goals and plan    Did Client receive a copy of treatment plan/revised plan:  Yes    Changes to Treatment Plan:  Yes, added interventions after use episode    Client agrees with plan/revised plan:  Yes    Any changes in Vulnerable Adult Status:  No    Substance Use Disorders:  None and Alcohol Use Disorder Severe (F10.20)       ) Care Coordination Activities:  talked with clinical supervisor and Marie Andersen about client use episode  2) Medical, Mental Health and other appointments the client attended: \"none this week, I do meet with therapist next week\"  3) Medication issues:  \"no issues, it works well\"  4) Physical and mental health problems:\"I fell on ice and sprained my arm,  Checked it out.  \"no mental health problems, except I am not happy that I used alcohol\"  5) Review and evaluation of the individual abuse prevention plan: Does not apply        ASAM Risk Ratings and Data       DIMENSION 1: Acute Intoxication/Withdrawal  The client's ability to cope with withdrawal " "symptoms and current state of intoxication       Acute Intoxication/Withdrawal - Current Risk Factor:  0    Reporting sober date of 3/12    Goals:  Remain abstinent or report any use    Data:  No goals worked on in this dimension. He shows no signs of use or withdrawal.  On 3/13 He reports last use changed to 3/12  He said he had no withdrawal, other than shakes, and that he is aware of Withdrawal protocal and how to get help if needed.  I) On 3/11 I talked to him about withdrawal protocal, calling 911 or getting to ER if needed.  A) Thanh verbalizes understanding of the process.  P) He reports he will call 911 or go to ER if he is \"ever concerned\" about withdrwal safety.      DIMENSION 2:  Biomedical Conditions and Complaints  The degree to which any physical disorder would interfere with treatment for substance abuse and the client's ability to tolerate any related discomfort     Biomedical Conditions and Complaints - Current Risk Factor:  1    Goals: regular medical check up    Data:  No goals worked on in this dimension.    on 3/18 Thanh reports \"zoloft working well, no other health issues, except I sprained my arm when I feel on the ice.   Checked it out and nothing is broken\"      DIMENSION 3:  Emotional/Behavioral/Cognitive Conditions and Complications  The degree to which any condition or complications are likely to interfere with treatment for substance abuse or with function in significant life areas and the likelihood of risk of harm to self or others.     Emotional/Behavioral - Current Risk Factor:  2    DSM-5 Diagnoses:   per client anxiety disorder       Goals: Take pHQ-9 and JOSE L-7              Take medications as directed               Healthy coping for anxiety    Data: No goals worked on in this dimension. On 3/18 client reports 0 on anxiety, 2 on depression and 0 on stress(on likert scale where 0= none and 10= high).        DIMENSION 4:  Readiness to Change  Consider the amount of support and " "encouragement necessary to keep the client involved in treatment.     Readiness to Change - Current Risk Factor:  1    Goals:  Continue to find internal motivation for change    Data: No goals worked on in this dimension.  On 3/18 he reported his motivation for sobriety is 8 and for community support is 9         DIMENSION 5:  Relapse/Continued Use/Continued Problem Potential  Consider the degree to which the client recognizes relapse issues and has the skills to prevent relapse of either substance use or mental health problems.     Relapse/Continued Use/Continued Problem Potential - Current Risk Factor:  3    Goals:  Plan for coping with and maintaining sobriety    Data:On 3/18 Thanh attended a 1 hour 1:1 to discuss his thoughts and plans.  He reported he was given \"an ultimatum from his wife that he needed to attend in patient or she would leave him.  He reported he \"spoke honestly from his heart to her and said he does not believe going to [facility] for 28 days will be helfpul\".  He said he feels the structure of his job, AA and current tx group are more helpful.  He said he told his wife \"I will do 90 meetings in 90 days, instead\".  He reports she did not kick him out. On 3/20 Thanh and I added goals to tx plan, including disscussion of use episde and  That he will discuss use episode with therapist.  I) on 3/18 I discussed my concerns with Thanh, telling him he made many good decisions, but my biggest concern would be \"maybe getting away and focusing on self in IP setting could be helpful\"  He verbalized understanding and said \"I did not think of that\".  But he did Discuss another option (see dim. 6).  I also talked with Thanh about naltrexone use for cravings.   A) on 3/18 he said he wants to think about his decisions and will report back on 3/20, which he did and seems to have a good plan and good reasons for his plan.  P) discuss use episode with therapist, after he does he is going to sign a release so I can talk " "with her.  He said he is going to talk with wife about family sessions.  On 3/11 Thanh discussed that he had bowling that evening and evening of 3/12, he enjoys it, so it is why he cannot attend Family group on 3/18 and 3/25.  On 3/3 he reported he had used on 3/12.  He said he knew he would \"come clean\" even when he was using.  He said his wife thinks he should do [place].  Thanh discussed that as a plan, as well as the support he will use.  He wants to do what is best and wants some time to decide.  I) I talked with Thanh about his support and decision making strategy.  I told him we can discuss this again on phone or 3/18, whichever he sees fit to do.  I will talk with him again about signing a release for his therapist, now that he has used again, I told him I think it is important he talk to her about his use.  A) He seems to be progressing on his choices, and making more healty decisions.  Even though he used, he reported it, he seemed more open to feedback and he seems to be growing in esteem for self. P) Discuss further plans next week, after he has had time to process.          DIMENSION 6:  Recovery Environment  Consider the degree to which key areas of the client's life are supportive of or antagonistic to treatment participation and recovery.     Recovery Environment - Current Risk Factor:  2    Support group attended this week:  No    Did family agree to attend family week:  No    If yes:  none schedule this week    Goals:  Build sober structure and support    Data: on 3/18 Thanh discussed that he did not feel he needed support like a lodging program, but if he did he would go to [facility].  He said he feels it would only be out of \"punishment\" if he chose to go.  He asked if this counselor could sit down with he and his wife and discuss resources, etc. I) on 3/18 I told him yes I could sit down with he and his wife and that the next family sessions are April 8 and 15.   A) He seems interested in weighing " options and making the best choice for self.  He verbalizes understanding of OP option and IP option.  P) On 3/20 he will discuss with the counselor what he came up with. On 3/20 he agreed to: discuss use episode with therapist, after he does he is going to sign a release so I can talk with her.  He is going to talk with wife about family sessions   On 3/11 Thanh received Non - AA resources.  He reports he likes AA and the way the format is, but that having other resources may come in handy.  I) on 3/11 I gave him the resources, discussed the generalities of the resources and encouraged ongoing awareness of what is available for support A) Thanh seems happy with his AA, but open to awareness of other support, should he ever find the need for change.  P) keep resources in folder.           Goals and interventions wk of 3/18 Use episode, add tx goals.  3/11, contacted Bonita Bonilla, [place] assignment, Non-AA resources, Use episode  3/4: Pleasure Unwoven   2/18: boundary and anger discussion 1/1 2/11: initial service, dim 3  Bethany Mijares, MS, LADC, LPC

## 2019-03-20 ENCOUNTER — HOSPITAL ENCOUNTER (OUTPATIENT)
Dept: BEHAVIORAL HEALTH | Facility: CLINIC | Age: 29
End: 2019-03-20
Attending: SOCIAL WORKER
Payer: COMMERCIAL

## 2019-03-20 PROCEDURE — H2035 A/D TX PROGRAM, PER HOUR: HCPCS | Mod: HQ

## 2019-03-20 NOTE — PROGRESS NOTES
Acknowledgement of Current Treatment Plan - Additional Entries       ADDITIONAL GOALS AND INTERVENTIONS:    Signatures/dates required for any additional Problems, Goals, and/or Interventions added to treatment plan:  Change/Addition in Dimension 3 on date:_3/20________  Insert here: Will discuss use with therapist                       Discuss what lead up to relapse                      Meet 1:1 with Rahel                      Talk with wife about meeting with Rahel and Thanh/ and about family participaation        I have participated in creating this change and/or addition to my treatment plan copied above.   I have been given a copy of this signature page with change/addition to my treatment plan and I am in agreement with how it is written in the electronic record.       Patient signature:       ________________________________________________________________________      Counselor/Therapist signature:    ________________________________________________________________              Change in date of last use:       ________________________________________________________________        Patient signature/date (required for change in last use date):     ________________________________________________________________

## 2019-03-21 ENCOUNTER — HOSPITAL ENCOUNTER (OUTPATIENT)
Dept: BEHAVIORAL HEALTH | Facility: CLINIC | Age: 29
End: 2019-03-21
Attending: SOCIAL WORKER
Payer: COMMERCIAL

## 2019-03-21 PROCEDURE — H2035 A/D TX PROGRAM, PER HOUR: HCPCS | Mod: HQ

## 2019-03-21 NOTE — PROGRESS NOTES
"Nolan Mayo  March 21, 2019  [id number]    [facility] Mental Health Process Group Note      Day and Date: Thursday, 3/21/19         Number of participants: 1      Length of Group: 2 hours    Thanh was the only one who was able to attend the group session today so this topic was covered in an individual session format.  Duration:  2 hours.      Goal of the Group: Learn the importance of Stress Management and techniques to reduce PAWS and stress-related symptoms, increase resiliency, and learn healthy routines to replace dysfunctional habits       [place]: Group Topics and Activities      I.  D3 Intervention and Group Topic addressed: Stress management, productivity, balance and procrastination     II. Mindfulness and/or Motivational Component: Group completed progressive relaxation and deep breathing techniques and identified skills to work on this week. Group also did the mindfulness exercise Awareness of Sounds      III. Additional Activity: Clients identified personal stressors and if/how addiction intensified their stress. They completed the [place] exercise that involves the mindfulness work.  The group also learned tips to overcome procrastination and the mental health benefits of  de-cluttering  their environment.        IV. Review of Progress:   A. Client s self-report: Thanh reported having a good week and \"a week of getting things off my chest and not eing negative to the process.\"  He said he used to say his stress was low at 1 or 2 but now realizes that it has been about a 5 for the last 60 days.  He did mindfulness today and said he would begin to practice this. He is going to 90 meetings in 90 days. He is open to reading the book \"Adult Children of Alcoholics.\"  His stress screening was a 10 (moderate) and his warning signs include, \"grinding my teeth and breathing through my nose\" and for relief \"I chew gum, drink water, count to 10, and splash cold water on my face.\" He learned more ways to reduce stress today.  " "He has a lot of stress at home as his mother-in-law and his brother-in-law live them currently and this is hard on him as he likes time to be alone. We discussed the room he uses to ne able to get away and his wife is currently working with his brother to get him to move out.         B. Therapist s assessment:    Thanh is more open with each session and shared a lot about his relationships today. His father is sober now for 15 months and it appears tries to run his son's recovery as well. He is open to learning more about how being raised by a father with alcoholism may have affected him.  He stated his father was \"an anomaly of data, he got away with so much\" and that he used to see his father \"passed out by 8 PM every night.\"  He added, \"I'm his only child and I know he loves me more than anything in the world.\" His Elem exercise showed some unique questions he had on pg 2 that reflected that he was entitled to be treated with respect at work.        V.   Reflection and evaluation of today s group experience:  Gave verbal feedback and filled out evaluation form. Client wrote the most important thing learned today was \"Mindful thinking can de-stress\" and \"I need to know my rights\" [in his relationships] and he was surprised by the aromatherapy today.      VI. Assignments or activities to do for next week: Continue to do journal before bed, do at least one mindfulness exercise in the day, address one self-care area a week, incorporate one stress reduction technique into daily schedule of structure and routine for this week.      Therapeutic techniques in this session:  Motivational Therapy, CBT/DBT/ACT, Supportive, Behavioral Modification and Mindfulness      Additional Treatment Referrals/Follow-up Issues to Address: Not needed at this time.      Change in Treatment Plan: No change, this group completes one intervention in Treatment Plan under Dimension 3.        Change in Diagnosis: No change in diagnosis " indicated.

## 2019-03-25 ENCOUNTER — HOSPITAL ENCOUNTER (OUTPATIENT)
Dept: BEHAVIORAL HEALTH | Facility: CLINIC | Age: 29
End: 2019-03-25
Attending: SOCIAL WORKER
Payer: COMMERCIAL

## 2019-03-25 PROCEDURE — H2035 A/D TX PROGRAM, PER HOUR: HCPCS

## 2019-03-25 NOTE — PROGRESS NOTES
"  CD ADULT Progress Note     Treatment Plan Review completed on:  3/21     Attendance Dates: 3/25 (1:1) and 27 for 3 hours      Total # of Group Sessions:  15 group sessions for 29  hours, plus 2 hour orientation/initial service/tx plan 1/29  Plus 1:1 on 3/18 for 1 hour and 1:1 on 3/25 for 1 hour     MONDAY TUESDAY WEDNESDAY THURSDAY FRIDAY SATURDAY SUNDAY Total   Group Therapy   2        Specialty Groups*           1:1 1          Family Program           [place]             Phase II             Absent           Total 1  2     3     *Specialty Groups include Mental Health Care, Assertiveness and Communication, Sobriety Maintenance Skills, Spiritual Care, Stress Management, Relapse Prevention, Family Systems.                    Learning Style:  Hands on    Staff member contributing:  Btehany Mijares, MS, LADC, LPC  Marie Andersen, [facility] HospitalSW  Fabi Giles, Intern    Received supervision:  Yes 3/18    Client:  contributed to goals and plan    Did Client receive a copy of treatment plan/revised plan:  Yes    Changes to Treatment Plan:  No    Client agrees with plan/revised plan:  Yes    Any changes in Vulnerable Adult Status:  No    Substance Use Disorders:  None and Alcohol Use Disorder Severe (F10.20)       ) Care Coordination Activities:  talked with  Marie Andersen about make up session he will do  2) Medical, Mental Health and other appointments the client attended: \"I met with my therapist today\" (3/25)  3) Medication issues:  \"no issues, it works well\"  4) Physical and mental health problems:  \"no mental health problems\"  5) Review and evaluation of the individual abuse prevention plan: Does not apply        ASAM Risk Ratings and Data       DIMENSION 1: Acute Intoxication/Withdrawal  The client's ability to cope with withdrawal symptoms and current state of intoxication       Acute Intoxication/Withdrawal - Current Risk Factor:  0    Reporting sober date of 3/12    Goals:  Remain abstinent or report any " "use    Data:  Wk of 3/25: No goals worked on in this dimension. He shows no signs of use or withdrawal.  On 3/13 He reports last use changed to 3/12      DIMENSION 2:  Biomedical Conditions and Complaints  The degree to which any physical disorder would interfere with treatment for substance abuse and the client's ability to tolerate any related discomfort     Biomedical Conditions and Complaints - Current Risk Factor:  1    Goals: regular medical check up    Data: Week of  3/25  No goals worked on in this dimension.    on 3/18 Thanh reports \"zoloft working well, no other health issues, except I sprained my arm when I feel on the ice.   Checked it out and nothing is broken\"      DIMENSION 3:  Emotional/Behavioral/Cognitive Conditions and Complications  The degree to which any condition or complications are likely to interfere with treatment for substance abuse or with function in significant life areas and the likelihood of risk of harm to self or others.     Emotional/Behavioral - Current Risk Factor:  2    DSM-5 Diagnoses:   per client anxiety disorder       Goals: Take pHQ-9 and JOSE L-7              Take medications as directed               Healthy coping for anxiety    Data:  On 3/25 client reports 1 on anxiety, 2 on depression and 4 on stress(on likert scale where 0= none and 10= high).  He reported he went to his therapy session the morning of 3/25 and that was stressful.  He reported that he talked with therapist about his using episode and that it was difficult but he is glad he did.  He reports he would like to meet with this counselor and his wife, but they are not sure of possible dates yet.  He said he feels his cravings run high    I) on 3/25 I reported I think family sessions would be very helpful to Thanh and his wife and I hope they find the time soon to do the sessions.   I also said I thought Naltrexone could be helpful for the cravings he describes that add to his anxiety.  He reports he will talk " "to PA about it.    A) Thanh seemed happy to hear about Naltrexone.  He seems to be finding bowling important and taking up time that \"helps keep me sober\", but also finding it is not allowing him to go to the family sessions, he does seem legitimately torn about this.  P) Thanh will talk to PA about Naltrexone.  Thanh committed to be upfront with his therapist when he feels like using or if he uses again.      DIMENSION 4:  Readiness to Change  Consider the amount of support and encouragement necessary to keep the client involved in treatment.     Readiness to Change - Current Risk Factor:  1    Goals:  Continue to find internal motivation for change    Data: No goals worked on in this dimension.  On 3/18 he reported his motivation for sobriety is 8 and for community support is 9         DIMENSION 5:  Relapse/Continued Use/Continued Problem Potential  Consider the degree to which the client recognizes relapse issues and has the skills to prevent relapse of either substance use or mental health problems.     Relapse/Continued Use/Continued Problem Potential - Current Risk Factor:  3    Goals:  Plan for coping with and maintaining sobriety    Data:Week of 3/25, no goals worked on in this dimension    On 3/18 Thanh attended a 1 hour 1:1 to discuss his thoughts and plans.  He reported he was given \"an ultimatum from his wife that he needed to attend in patient or she would leave him.  He reported he \"spoke honestly from his heart to her and said he does not believe going to [facility] for 28 days will be helfpul\".  He said he feels the structure of his job, AA and current tx group are more helpful.  He said he told his wife \"I will do 90 meetings in 90 days, instead\".  He reports she did not kick him out. On 3/20 Thanh and I added goals to tx plan, including disscussion of use episde and  That he will discuss use episode with therapist.  I) on 3/18 I discussed my concerns with Thanh, telling him he made many good decisions, but my " "biggest concern would be \"maybe getting away and focusing on self in IP setting could be helpful\"  He verbalized understanding and said \"I did not think of that\".  But he did Discuss another option (see dim. 6).  I also talked with Thanh about naltrexone use for cravings.   A) on 3/18 he said he wants to think about his decisions and will report back on 3/20, which he did and seems to have a good plan and good reasons for his plan.  P) discuss use episode with therapist, after he does he is going to sign a release so I can talk with her.  He said he is going to talk with wife about family sessions.            DIMENSION 6:  Recovery Environment  Consider the degree to which key areas of the client's life are supportive of or antagonistic to treatment participation and recovery.     Recovery Environment - Current Risk Factor:  2    Support group attended this week:  No    Did family agree to attend family week:  No    If yes:  none schedule this week    Goals:  Build sober structure and support    Data: D: On 3/25 Thanh discussed that he is wanting to be involved in the [employer] for nataly so cannot go to family group on 4/8.  On 3/27 Thanh participated in a discussion regarding the responsibilities of self vs others. He reports he felt that it pertained to his current situation at home. He said he is learning about boundaries and how he feels and what he believes about things. He said the discussion of boundaries was meaningful.   On 3/27 He said he wants to continue to learn about this and not feel guilty when sharing his thoughts or feelings if they differ from his wife.  I: On 3/25, I told Thanh that I understand he has passion for nataly and for his wife, and I will reschedule the famaily session, but hope he sticks with the times, as we have now changed it 3x.  On 3/27 I discussed  \"person and relationship rights\" and stressed these rights are for all and they are meant for personal empowerment. Discussed " "feelings when one feels responsible for others compared to feeling responsible to others and the differences in them.   A: On 3/25, Thanh understood it \"looks like bowling is more important, but it isn't and I will stick with next family time\" and seemed committed to the next sessions.   On 3/27 appeared engaged in the discussion of responsibilities and boundaries and provided valuable input. He is demonstrating that He is wanting to gain healthy coping skills for him and his relationship.  P: Thanh  Said  will continue to exercise his rights in relationships, but in a kinder way than he has been.     on 3/18 Thanh discussed that he did not feel he needed support like a lodging program, but if he did he would go to [facility].  He said he feels it would only be out of \"punishment\" if he chose to go.  He asked if this counselor could sit down with he and his wife and discuss resources, etc. I) on 3/18 I told him yes I could sit down with he and his wife and that the next family sessions are April 8 and 15.   A) He seems interested in weighing options and making the best choice for self.  He verbalizes understanding of OP option and IP option.  P) On 3/20 he will discuss with the counselor what he came up with. On 3/20 he agreed to: discuss use episode with therapist, after he does he is going to sign a release so I can talk with her.  He is going to talk with wife about family sessions             Goals and interventions wk of 3;25 Boundaries, responsibilities, family sessions discussion   3/18 Use episode, add tx goals.  3/11, contacted Bonita Bonilla, [place] assignment, Non-AA resources, Use episode  3/4: Pleasure [employer]   2/18: boundary and anger discussion 1/1 2/11: initial service, dim 3  Bethany Mijares, MS, LADC, LPC    "

## 2019-03-27 ENCOUNTER — HOSPITAL ENCOUNTER (OUTPATIENT)
Dept: BEHAVIORAL HEALTH | Facility: CLINIC | Age: 29
End: 2019-03-27
Attending: SOCIAL WORKER
Payer: COMMERCIAL

## 2019-03-27 PROCEDURE — H2035 A/D TX PROGRAM, PER HOUR: HCPCS | Mod: HQ

## 2019-04-08 ENCOUNTER — HOSPITAL ENCOUNTER (OUTPATIENT)
Dept: BEHAVIORAL HEALTH | Facility: CLINIC | Age: 29
End: 2019-04-08
Attending: SOCIAL WORKER
Payer: COMMERCIAL

## 2019-04-08 PROCEDURE — H2035 A/D TX PROGRAM, PER HOUR: HCPCS | Mod: HQ

## 2019-04-08 ASSESSMENT — ANXIETY QUESTIONNAIRES
3. WORRYING TOO MUCH ABOUT DIFFERENT THINGS: SEVERAL DAYS
6. BECOMING EASILY ANNOYED OR IRRITABLE: SEVERAL DAYS
1. FEELING NERVOUS, ANXIOUS, OR ON EDGE: SEVERAL DAYS
5. BEING SO RESTLESS THAT IT IS HARD TO SIT STILL: NOT AT ALL
2. NOT BEING ABLE TO STOP OR CONTROL WORRYING: SEVERAL DAYS
7. FEELING AFRAID AS IF SOMETHING AWFUL MIGHT HAPPEN: MORE THAN HALF THE DAYS
IF YOU CHECKED OFF ANY PROBLEMS ON THIS QUESTIONNAIRE, HOW DIFFICULT HAVE THESE PROBLEMS MADE IT FOR YOU TO DO YOUR WORK, TAKE CARE OF THINGS AT HOME, OR GET ALONG WITH OTHER PEOPLE: SOMEWHAT DIFFICULT
GAD7 TOTAL SCORE: 6

## 2019-04-08 ASSESSMENT — PATIENT HEALTH QUESTIONNAIRE - PHQ9
5. POOR APPETITE OR OVEREATING: NOT AT ALL
SUM OF ALL RESPONSES TO PHQ QUESTIONS 1-9: 5

## 2019-04-09 ASSESSMENT — ANXIETY QUESTIONNAIRES: GAD7 TOTAL SCORE: 6

## 2019-04-10 ENCOUNTER — HOSPITAL ENCOUNTER (OUTPATIENT)
Dept: BEHAVIORAL HEALTH | Facility: CLINIC | Age: 29
End: 2019-04-10
Attending: SOCIAL WORKER
Payer: COMMERCIAL

## 2019-04-10 PROCEDURE — H2035 A/D TX PROGRAM, PER HOUR: HCPCS | Mod: HQ

## 2019-04-15 ENCOUNTER — HOSPITAL ENCOUNTER (OUTPATIENT)
Dept: BEHAVIORAL HEALTH | Facility: CLINIC | Age: 29
End: 2019-04-15
Attending: SOCIAL WORKER
Payer: COMMERCIAL

## 2019-04-15 PROCEDURE — H2035 A/D TX PROGRAM, PER HOUR: HCPCS | Mod: HQ

## 2019-04-15 NOTE — PROGRESS NOTES
"  CD ADULT Progress Note     Treatment Plan Review completed on:  4/17     Attendance Dates: 4/15 for 2 hours   Thanh called to report that he is unable to attend group on 4/17 due to work obligations.      Total # of Group Sessions:  21 group sessions for 41  hours, plus 2 hour orientation/initial service/tx plan 1/29  Plus 1:1 on 3/18 for 1 hour and 1:1 on 3/25 for 1 hour     MONDAY TUESDAY WEDNESDAY THURSDAY FRIDAY SATURDAY SUNDAY Total   Group Therapy 2          Specialty Groups*           1:1           Family Program           [place]             Phase II             Absent           Total 2       2     *Specialty Groups include Mental Health Care, Assertiveness and Communication, Sobriety Maintenance Skills, Spiritual Care, Stress Management, Relapse Prevention, Family Systems.                    Learning Style:  Hands on    Staff member contributing:  Bethany Mijares, MS, LADC, LPC  Marie Andersen, [facility] CenterSW  Fabi Giles, Intern    Received supervision:  Yes 3/18    Client:  contributed to goals and plan    Did Client receive a copy of treatment plan/revised plan:  Yes    Changes to Treatment Plan:  No    Client agrees with plan/revised plan:  Yes    Any changes in Vulnerable Adult Status:  No    Substance Use Disorders:  None and Alcohol Use Disorder Severe (F10.20)       ) Care Coordination Activities:  Left vm with Ham' therapist, as I now have a release.  Just wanted to introduce myself and mention what Thanh is working on in tx  2) Medical, Mental Health and other appointments the client attended: No  3) Medication issues:  \"not sure Zoloft is working well\"  4) Physical and mental health problems:  He reports feeling some stress, anxiety, and depresson. Reports \"not as happy as I would like to be, being this far into tx\"  5) Review and evaluation of the individual abuse prevention plan: Does not apply        ASAM Risk Ratings and Data       DIMENSION 1: Acute Intoxication/Withdrawal  The client's " "ability to cope with withdrawal symptoms and current state of intoxication       Acute Intoxication/Withdrawal - Current Risk Factor:  0    Reporting sober date of 3/12    Goals:  Remain abstinent or report any use    Data:  Wk of 4/15: Thanh reports his last use of 3/12/19. No goals worked on in this dimension. He shows no signs of use or withdrawal.        DIMENSION 2:  Biomedical Conditions and Complaints  The degree to which any physical disorder would interfere with treatment for substance abuse and the client's ability to tolerate any related discomfort     Biomedical Conditions and Complaints - Current Risk Factor:  1    Goals: regular medical check up    Data: Week of 4/15 Thanh reports that he doesn't not believe his Zoloft is working for him.  No goals worked on in this dimension.     DIMENSION 3:  Emotional/Behavioral/Cognitive Conditions and Complications  The degree to which any condition or complications are likely to interfere with treatment for substance abuse or with function in significant life areas and the likelihood of risk of harm to self or others.     Emotional/Behavioral - Current Risk Factor:  2    DSM-5 Diagnoses:   per client anxiety disorder       Goals: Take pHQ-9 and JOSE L-7              Take medications as directed               Healthy coping for anxiety    Data:  D: On 4/15 Thanh participated in a discussion about anger. Thanh reported that he is a \"very angry person\". Thanh discussed what things made him angry.  Thanh reported that he has not apologized to his wife for his most recent use in March. He stated that if he felt forgiven then he would feel allowed to drink again. He stated that he \"doesn't want to be forgiven, he wants to be healthy\". Thanh reflected on a reading from group stating that while using, he would become more angry and it would lead to more drinking.  I: On 4/15 discussed with Thanh how he physically feels when he is angry. Counselor talked with Thanh about the fights that he " would pick with his wife and how it impacted his use.  Counselor, Rahel, mentioned this to Ham' therapist, via vm, just so she would know.  Also told Thanh I, rahel, mentioned this  A: Thanh seems to have an understanding about himself and his feelings. He is able to acknowledge his traits and is aware that there is work that can be done to improve his mindset.  He discusses that they work on alcoholism in therapy and I am seeing he is gaining awareness that working on anger is also important.  P: Thanh will continue to use his coping mechanisms when he feels angry. Continue to focus on items that he is able to control and try letting go of items he is unable to control.  He will discuss Anger issues with his therapist as needed.  This counselor will wait for call back from Ham' therapist      DIMENSION 4:  Readiness to Change  Consider the amount of support and encouragement necessary to keep the client involved in treatment.     Readiness to Change - Current Risk Factor:  1    Goals:  Continue to find internal motivation for change    Data: No goals worked on in this dimension.  On 4/15 he reported his motivation for sobriety is 9 out of 10.         DIMENSION 5:  Relapse/Continued Use/Continued Problem Potential  Consider the degree to which the client recognizes relapse issues and has the skills to prevent relapse of either substance use or mental health problems.     Relapse/Continued Use/Continued Problem Potential - Current Risk Factor:  3    Goals:  Plan for coping with and maintaining sobriety    Data:Week of 4/15, no goals worked on in this dimension.  Client attended 1 day this week, due to mandatory meeting at work        DIMENSION 6:  Recovery Environment  Consider the degree to which key areas of the client's life are supportive of or antagonistic to treatment participation and recovery.     Recovery Environment - Current Risk Factor:  2    Support group attended this week:  No    Did family agree to  attend family week:  No    If yes:  none schedule this week    Goals:  Build sober structure and support      D: On 4/15 Thanh reported that he has been in contact with his sponsor on 4/12 due to having the urge to drink. Thanh reported that he got off work early on Friday, which was part of his use pattern. Thanh stated that he had bowling with AA that evening and needed to make it to 6pm. Thanh reached out to his sponsor to discuss his wanting to drink. Sponsor checked in a few times with him prior to bowling starting and Thanh reported that it was helpful.  I: On 4/15 counselor reflected with Thanh about his experience from Friday 4/12. Counselor discussed with Thanh what would have happened if his sponsor was unavailable when he called.   A: Thanh appears that he wants to remain sober as evidenced by contacting his sponsor and successfully not using.  P: Thanh will continue to connect with his sponsor and attend meetings for support. Thanh has a backup plan in place in the event that his sponsor is unavailable and will use it if necessary.         Goals and interventions wk of 4/15 coping with anger, counselor talked to therapist, urges/sponsor  3;25 Boundaries, responsibilities, family sessions discussion   3/18 Use episode, add tx goals.  3/11, contacted Bonita Bonilla, [place] assignment, Non-AA resources, Use episode  3/4: Pleasure [employer]   2/18: boundary and anger discussion 1/1 2/11: initial service, dim 3  Bethany Mijares, MS, LADC, LPC

## 2019-04-22 ENCOUNTER — HOSPITAL ENCOUNTER (OUTPATIENT)
Dept: BEHAVIORAL HEALTH | Facility: CLINIC | Age: 29
End: 2019-04-22
Attending: SOCIAL WORKER
Payer: COMMERCIAL

## 2019-04-22 PROCEDURE — H2035 A/D TX PROGRAM, PER HOUR: HCPCS | Mod: HQ

## 2019-04-22 NOTE — PROGRESS NOTES
"  CD ADULT Progress Note     Treatment Plan Review completed on:  4/24     Attendance Dates: 4/22 and 4/24 for 2 hours each      Total # of Group Sessions:  23 group sessions for 45  hours, plus 2 hour orientation/initial service/tx plan 1/29  Plus 1:1 on 3/18 for 1 hour and 1:1 on 3/25 for 1 hour     MONDAY TUESDAY WEDNESDAY THURSDAY FRIDAY SATURDAY SUNDAY Total   Group Therapy 2  2        Specialty Groups*           1:1           Family Program           [place]             Phase II             Absent           Total 2  2     4     *Specialty Groups include Mental Health Care, Assertiveness and Communication, Sobriety Maintenance Skills, Spiritual Care, Stress Management, Relapse Prevention, Family Systems.                    Learning Style:  Hands on    Staff member contributing:  Bethany Mijares, MS, LADC, LPC  Marie Andersen, [facility]SW  Fabi Giles, Intern    Received supervision:  Yes 3/18    Client:  contributed to goals and plan    Did Client receive a copy of treatment plan/revised plan:  Yes    Changes to Treatment Plan:  No    Client agrees with plan/revised plan:  Yes    Any changes in Vulnerable Adult Status:  No    Substance Use Disorders:  Alcohol Use Disorder Severe (F10.20)       ) Care Coordination Activities:  talked with family counselor, as Thanh will attend next week  2) Medical, Mental Health and other appointments the client attended: No  3) Medication issues: Remains unsure if Zoloft is working  4) Physical and mental health problems:  He reports feeling some anxiety and depresson \"but I am ok\".   5) Review and evaluation of the individual abuse prevention plan: Does not apply    ASA Risk Ratings and Data       DIMENSION 1: Acute Intoxication/Withdrawal  The client's ability to cope with withdrawal symptoms and current state of intoxication       Acute Intoxication/Withdrawal - Current Risk Factor:  0    Reporting sober date of 3/12    Goals:  Remain abstinent or report any " "use    Data:  On 4/22: Thanh reports his last use of 3/12/19. No goals worked on in this dimension. He shows no signs of use or withdrawal.    DIMENSION 2:  Biomedical Conditions and Complaints  The degree to which any physical disorder would interfere with treatment for substance abuse and the client's ability to tolerate any related discomfort     Biomedical Conditions and Complaints - Current Risk Factor:  1    Goals: regular medical check up    Data: On 4/22 Thanh reports that he doesn't not believe his Zoloft is working for him, but he wants to wait another month before talking with provider, as it wasn't that long ago the dose was increased.    DIMENSION 3:  Emotional/Behavioral/Cognitive Conditions and Complications  The degree to which any condition or complications are likely to interfere with treatment for substance abuse or with function in significant life areas and the likelihood of risk of harm to self or others.     Emotional/Behavioral - Current Risk Factor:  2    DSM-5 Diagnoses:   per client anxiety disorder       Goals: Take pHQ-9 and JOSE L-7              Take medications as directed               Healthy coping for anxiety    Data: On 4/22 Thanh reported his anxiety at a 2, depression at a 1, and stress at 0 on a scale of 1= very little and 10=high. No goals worked on in this dimension. He reports that using mindfulness has helped over the past week.    DIMENSION 4:  Readiness to Change  Consider the amount of support and encouragement necessary to keep the client involved in treatment.     Readiness to Change - Current Risk Factor:  1    Goals:  Continue to find internal motivation for change    Data:   D: On 4/22 he reported his motivation for sobriety is 9 out of 10. Thanh participated in a discussion about assertiveness and anger. Thanh completed an assessment with a scale of feeling angry with specific events listed and scored \"getting angry fairly easy\". Thanh reported that the lack follow through from " "others is a trigger for his anger. Group also discussed distorted thinking and Thanh reported that he has the all-or nothing- thinking mentality with the motivation of knowing what he should and shouldn't do. He reported that he sought treatment, began therapy, and stopped drinking because his wife said he should. Thanh reported after his use in March, he began to realize that these are things that he wants for himself and feels that he is worthy of them. He reported that \"making peace with things helps\". He reported that he felt good celebrating his 6 month wedding anniversary with his wife and being able to get her flowers and breakfast. On 4/24 Thanh discussed challenges of receiving feedback. He stated there are certain discussions that he avoids with his wife because he does not want to receive the feedback. Thanh is attending family sessions the next two weeks, and may choose to discuss his concerns with his wife after.  I: On 4/22 discussed with Thanh the integrity levels that he believes others should have and asked if being let down from others has led to him feeling let down by the system as a whole. Discussed with Thanh that the relationship change with his wife could have been a physical impact on his life. On 4/24 counselor demonstrated S.T.O.P. Technique and discussed situations when this technique could be used. Acknowledged Thanh wanting to not have certain discussions with his wife until the family component is completed. Discussed the recovery journey and how it can be completed for ones self, rather than others out there driving his journey.  A: Thanh appears to have an increasing awareness of his wants and feelings. He acknowledges that he continues to feel better each day that he remains sober.  P: Thanh will continue to use his coping skills when he begins to feel angry. Thanh will recognize that he is unable to control others' follow through and use skills learned when they are not at his expectation level.   "   DIMENSION 5:  Relapse/Continued Use/Continued Problem Potential  Consider the degree to which the client recognizes relapse issues and has the skills to prevent relapse of either substance use or mental health problems.     Relapse/Continued Use/Continued Problem Potential - Current Risk Factor:  3    Goals:  Plan for coping with and maintaining sobriety    Data:Week of 4/22, no goals worked on in this dimension.    DIMENSION 6:  Recovery Environment  Consider the degree to which key areas of the client's life are supportive of or antagonistic to treatment participation and recovery.     Recovery Environment - Current Risk Factor:  2    Support group attended this week:  No    Did family agree to attend family week:  No    If yes:  none schedule this week    Goals:  Build sober structure and support    Data: On 4/24, Thanh reports that he has been to AA and met with his therapist last week. He also reports he and his wife will attend family week, next week and he is glad she is willing.  I) On 4/24 I told Thanh I thought it was positive he and wife will attend family, and to take it for what it is, not having expectations of what has to come out of it, but accepting what does.  A) he seems happy his wife will attend and he seems open to the feedback that reality may be better to keep in mind than expectations of what he thinks his wife should get out of family sessions.  P) attend family week 4/29 and 5/6 .    D: On 4/15 Thanh reported that he has been in contact with his sponsor on 4/12 due to having the urge to drink. Thanh reported that he got off work early on Friday, which was part of his use pattern. Thanh stated that he had bowling with AA that evening and needed to make it to 6pm. Thanh reached out to his sponsor to discuss his wanting to drink. Sponsor checked in a few times with him prior to bowling starting and Thanh reported that it was helpful.  I: On 4/15 counselor reflected with Thanh about his experience from  Friday 4/12. Counselor discussed with Thanh what would have happened if his sponsor was unavailable when he called.   A: Thanh appears that he wants to remain sober as evidenced by contacting his sponsor and successfully not using.  P: Thanh will continue to connect with his sponsor and attend meetings for support. Thanh has a backup plan in place in the event that his sponsor is unavailable and will use it if necessary.         Goals and interventions wk of4/22  assertiveness, family week discussion   4/15 coping with anger, counselor talked to therapist, urges/sponsor  3;25 Boundaries, responsibilities, family sessions discussion   3/18 Use episode, add tx goal    Bethany Mijares, MS, LADC, LPC

## 2019-04-24 ENCOUNTER — HOSPITAL ENCOUNTER (OUTPATIENT)
Dept: BEHAVIORAL HEALTH | Facility: CLINIC | Age: 29
End: 2019-04-24
Attending: SOCIAL WORKER
Payer: COMMERCIAL

## 2019-04-24 PROCEDURE — H2035 A/D TX PROGRAM, PER HOUR: HCPCS | Mod: HQ

## 2019-04-29 ENCOUNTER — HOSPITAL ENCOUNTER (OUTPATIENT)
Dept: BEHAVIORAL HEALTH | Facility: CLINIC | Age: 29
End: 2019-04-29
Attending: SOCIAL WORKER
Payer: COMMERCIAL

## 2019-04-29 PROCEDURE — H2035 A/D TX PROGRAM, PER HOUR: HCPCS | Mod: HQ

## 2019-05-02 NOTE — PROGRESS NOTES
04/29/19  5:30 PM  D) Nolan attended the Family Program with his wife. Listened as she shared how his drinking has affected her. Discussed the details of his recovery plan. Expressed desire to be sober and repair their relationship. I) Facilitate group. A) Nolan seems to be seeking support from his family. P) Folow recommendations of his counselor.

## 2019-05-08 ENCOUNTER — HOSPITAL ENCOUNTER (OUTPATIENT)
Dept: BEHAVIORAL HEALTH | Facility: CLINIC | Age: 29
End: 2019-05-08
Attending: SOCIAL WORKER
Payer: COMMERCIAL

## 2019-05-08 PROCEDURE — H2035 A/D TX PROGRAM, PER HOUR: HCPCS | Mod: HQ

## 2019-05-08 NOTE — PROGRESS NOTES
"  CD ADULT Progress Note     Treatment Plan Review completed on:  5/8.      Client attended family session with Jarret Garcia, counselor.  He is unable to attend group on 5/1, due to a mandatory meeting.  All notes after attendance grid are based on last attendance with this counselor and will be updated when client returns next week.       Attendance Dates: 5/8.  He was supposed to attend family week on 5/6, but there was a mix up but this will be made up next week      Total # of Group Sessions:  23 group sessions for 45  Hours, plus 1 family session of 2 hours on 4/29, plus 2 hour orientation/initial service/tx plan 1/29  Plus 1:1 on 3/18 for 1 hour and 1:1 on 3/25 for 1 hour     MONDAY TUESDAY WEDNESDAY THURSDAY FRIDAY SATURDAY SUNDAY Total   Group Therapy   2        Specialty Groups*           1:1           Family Program           [place]             Phase II             Absent           Total   2     2     *Specialty Groups include Mental Health Care, Assertiveness and Communication, Sobriety Maintenance Skills, Spiritual Care, Stress Management, Relapse Prevention, Family Systems.                    Learning Style:  Hands on    Staff member contributing:  Bethany Mijares, MS, [facility] [place], LPC  Jarret Giles, Intern    Received supervision:  Yes 3/18    Client:  contributed to goals and plan    Did Client receive a copy of treatment plan/revised plan:  Yes    Changes to Treatment Plan:  No    Client agrees with plan/revised plan:  Yes    Any changes in Vulnerable Adult Status:  No    Substance Use Disorders:  Alcohol Use Disorder Severe (F10.20)       ) Care Coordination Activities:  talked with family counselor, he had a mix up with group times... Thanh will attend next week.   2) Medical, Mental Health and other appointments the client attended: No  3) Medication issues: He still remains unsure if Zoloft is working, \"but I want to give it fair time before I adjust it again\"  4) Physical " "and mental health problems:  He reports feeling some anxiety and lethargy, \"but the rain contributes\"   5) Review and evaluation of the individual abuse prevention plan: Does not apply    ASA Risk Ratings and Data       DIMENSION 1: Acute Intoxication/Withdrawal  The client's ability to cope with withdrawal symptoms and current state of intoxication       Acute Intoxication/Withdrawal - Current Risk Factor:  0    Reporting sober date of 3/12    Goals:  Remain abstinent or report any use    Data:  On5/8: Thanh reports his last use of 3/12/19. No goals worked on in this dimension. He shows no signs of use or withdrawal.    DIMENSION 2:  Biomedical Conditions and Complaints  The degree to which any physical disorder would interfere with treatment for substance abuse and the client's ability to tolerate any related discomfort     Biomedical Conditions and Complaints - Current Risk Factor:  1    Goals: regular medical check up    Data: On 5/8  He still remains unsure if Zoloft is working, \"but I want to give it fair time before I adjust it again or talk about adjusting with provider\"    DIMENSION 3:  Emotional/Behavioral/Cognitive Conditions and Complications  The degree to which any condition or complications are likely to interfere with treatment for substance abuse or with function in significant life areas and the likelihood of risk of harm to self or others.     Emotional/Behavioral - Current Risk Factor:  2    DSM-5 Diagnoses:   per client anxiety disorder       Goals: Take pHQ-9 and JOSE L-7              Take medications as directed               Healthy coping for anxiety    Data: On 5/8 Thanh reported his anxiety at a 2, depression at a 2 and stress at 0 on a scale of 1= very little and 10=high. No goals worked on in this dimension. He reports that using mindfulness has helped over the past week.    DIMENSION 4:  Readiness to Change  Consider the amount of support and encouragement necessary to keep the client " "involved in treatment.     Readiness to Change - Current Risk Factor:  1    Goals:  Continue to find internal motivation for change    Data: On 5/8 Client read his handout and participated in a brief discussion on Step one.  He completed the assignment on powerlessness and unmanageability and and discussed this.  Thanh gave examples of how chemical use affected  his life, which included:   \"I acted like I didn't care about things I did\" \"I lost the ability to make consistent and good choices\",  \"I would use more and more to get the same effect and all it did was affect my health and relationships\"   I) on 5/8, I checked in with client on dimensions.  Discussed the meaning of powerlessness and unmanageability.  I talked about the provided reading and any questions he had.  Discussed the areas of life affected by use, which include: physical, emotional, cognitive, behavioral, spiritual and relationships.  I emphasized looking at all of these and not minimizing them, so as to break through denial, which often accompanies addiction and to be used as motivation of the continued sobriety he says he wants.  I gave referenced some resources for further information about Step 1 and discussed 12 step meetings and what I think the benefits might be over Speaker meetings, but encouraged Ham to keep up his regular attendance with meetings.    A)Seems to be understanding how parts of life became unmanageable.  Admits to powerlessness and unmanagability.  P) Thanh will continue to explore this step, directly or indirectly, in group.  He will keep her assignment as a reminder of the way chemicals interfered and made her life unmanageable.         DIMENSION 5:  Relapse/Continued Use/Continued Problem Potential  Consider the degree to which the client recognizes relapse issues and has the skills to prevent relapse of either substance use or mental health problems.     Relapse/Continued Use/Continued Problem Potential - Current Risk " Factor:  2    Goals:  Plan for coping with and maintaining sobriety    Data:Week of 5/8, no goals worked on in this dimension.    DIMENSION 6:  Recovery Environment  Consider the degree to which key areas of the client's life are supportive of or antagonistic to treatment participation and recovery.     Recovery Environment - Current Risk Factor:  2    Support group attended this week:  No    Did family agree to attend family week:  No    If yes:  none schedule this week    Goals:  Build sober structure and support    Data: Thanh reports attending AA this week.         Goals and interventions wk of 5/6 Step one  4/29 family week  4/22  assertiveness, family week discussion   4/15 coping with anger, counselor talked to therapist, urges/sponsor  3;25 Boundaries, responsibilities, family sessions discussion   3/18 Use episode, add tx goal    Bethany Mijares MS, LADC, LPC

## 2019-05-13 ENCOUNTER — HOSPITAL ENCOUNTER (OUTPATIENT)
Dept: BEHAVIORAL HEALTH | Facility: CLINIC | Age: 29
End: 2019-05-13
Attending: SOCIAL WORKER
Payer: COMMERCIAL

## 2019-05-13 PROCEDURE — H2035 A/D TX PROGRAM, PER HOUR: HCPCS | Mod: HQ

## 2019-05-14 NOTE — PROGRESS NOTES
5/13/19  5:30  D) Nolan attended Family Group with his wife . Listened to her talk about her experience and feelings. [place] about the brain and addiction. Discussed his recovery plan. I ) Facilitate group. A) Nolan seems to be working to  build support in his family. P) Follow recommendations of his counselor.

## 2019-05-15 ENCOUNTER — HOSPITAL ENCOUNTER (OUTPATIENT)
Dept: BEHAVIORAL HEALTH | Facility: CLINIC | Age: 29
End: 2019-05-15
Attending: SOCIAL WORKER
Payer: COMMERCIAL

## 2019-05-15 PROCEDURE — H2035 A/D TX PROGRAM, PER HOUR: HCPCS | Mod: HQ

## 2019-05-15 NOTE — PROGRESS NOTES
CD ADULT Progress Note     Treatment Plan Review completed on:  5/15      Attendance Dates: Family program on 5/13, group therapy on 5/15 and 5/16 (5/16 was 1:1 in lieu of group).      Total # of Group Sessions:  23 group sessions for 44  Hours, plus 1 family session of 2 hours on 4/29, plus 2 hour orientation/initial service/tx plan 1/29  Plus 1:1 on 3/18 for 1 hour and 1:1 on 3/25 for 1 hour     MONDAY TUESDAY WEDNESDAY THURSDAY FRIDAY SATURDAY SUNDAY Total   Group Therapy   2     2   Specialty Groups*           1:1    1 hour SK    1   Family Program   2       2   [place]             Phase II             Absent           Total 2  2 1    5     *Specialty Groups include Mental Health Care, Assertiveness and Communication, Sobriety Maintenance Skills, Spiritual Care, Stress Management, Relapse Prevention, Family Systems.                    Learning Style:  Hands on    Staff member contributing:  Bethany Mijares, MS, LADC, LPC  Fabi Giles, Intern  Jarret Og     Received supervision:  No    Client:  contributed to goals and plan    Did Client receive a copy of treatment plan/revised plan:  Yes    Changes to Treatment Plan:  No    Client agrees with plan/revised plan:  Yes    Any changes in Vulnerable Adult Status:  No    Substance Use Disorders:  Alcohol Use Disorder Severe (F10.20)       ) Care Coordination Activities:  coordinated group with Jarret Brunner   2) Medical, Mental Health and other appointments the client attended: No  3) Medication issues: Thanh reports not taking any medications  4) Physical and mental health problems:  Thanh reports feeling overwhelmed by a busy schedule  5) Review and evaluation of the individual abuse prevention plan: Does not apply    ASAM Risk Ratings and Data       DIMENSION 1: Acute Intoxication/Withdrawal  The client's ability to cope with withdrawal symptoms and current state of intoxication       Acute  "Intoxication/Withdrawal - Current Risk Factor:  0    Reporting sober date of 3/12    Goals:  Remain abstinent or report any use    Data:  On5/15: Thanh reports his last use of 3/12/19. No goals worked on in this dimension. He shows no signs of use or withdrawal.    DIMENSION 2:  Biomedical Conditions and Complaints  The degree to which any physical disorder would interfere with treatment for substance abuse and the client's ability to tolerate any related discomfort     Biomedical Conditions and Complaints - Current Risk Factor:  1    Goals: regular medical check up    Data: On 5/15 no goals worked on in this dimension.    DIMENSION 3:  Emotional/Behavioral/Cognitive Conditions and Complications  The degree to which any condition or complications are likely to interfere with treatment for substance abuse or with function in significant life areas and the likelihood of risk of harm to self or others.     Emotional/Behavioral - Current Risk Factor:  2    DSM-5 Diagnoses:   per client anxiety disorder       Goals: Take pHQ-9 and JOSE L-7              Take medications as directed               Healthy coping for anxiety    Data: On 5/15 Thanh reported his anxiety at a 1, depression at a 0 and stress at 0 on a scale of 1= very little and 10=high. No goals worked on in this dimension.     DIMENSION 4:  Readiness to Change  Consider the amount of support and encouragement necessary to keep the client involved in treatment.     Readiness to Change - Current Risk Factor:  1    Goals:  Continue to find internal motivation for change    Data:   D: On 5/15 Thanh participated in a discussion about assertiveness. He reported that historically if things have not gone his way, he goes into \"screw it\" mode. Thanh reported that the family sessions he attended with his wife were beneficial and he demonstrates awareness of needing to talk about his feelings. Thanh reported that he has had a few stressful days recently and has been constantly on the " go.  I: Discussed with Thanh the improvements he has made in increasing his flexibility of being open to working on his communication skills. Acknowledged the importance of Thanh taking time for himself.  A: Thanh demonstrates increased awareness of his assertiveness and the changes that he needs to make to maintain his recovery. Thanh verbalizes that he wants recovery for himself, not for someone else.  P: Thanh will continue to work on discussing his feelings with his wife and starting those conversations.            DIMENSION 5:  Relapse/Continued Use/Continued Problem Potential  Consider the degree to which the client recognizes relapse issues and has the skills to prevent relapse of either substance use or mental health problems.     Relapse/Continued Use/Continued Problem Potential - Current Risk Factor:  2    Goals:  Plan for coping with and maintaining sobriety    Data:Week of 5/15, no goals worked on in this dimension.    DIMENSION 6:  Recovery Environment  Consider the degree to which key areas of the client's life are supportive of or antagonistic to treatment participation and recovery.     Recovery Environment - Current Risk Factor:  2    Support group attended this week:  No    Did family agree to attend family week:  No    If yes:  none schedule this week    Goals:  Build sober structure and support    Data: Week of 5/15 Thanh reports attending AA this week and connecting with sponsor and other supportive people.         Goals and interventions wk of 5/15 family week attendance, assertiveness  5/6 Step one  4/29 family week  4/22  assertiveness, family week discussion   4/15 coping with anger, counselor talked to therapist, urges/sponsor  Bethany Mijares, MS, LADC, LPC

## 2019-05-16 ENCOUNTER — HOSPITAL ENCOUNTER (OUTPATIENT)
Dept: BEHAVIORAL HEALTH | Facility: CLINIC | Age: 29
End: 2019-05-16
Attending: SOCIAL WORKER
Payer: COMMERCIAL

## 2019-05-16 PROCEDURE — H2035 A/D TX PROGRAM, PER HOUR: HCPCS
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
## 2019-05-22 ENCOUNTER — HOSPITAL ENCOUNTER (OUTPATIENT)
Dept: BEHAVIORAL HEALTH | Facility: CLINIC | Age: 29
End: 2019-05-22
Attending: SOCIAL WORKER
Payer: COMMERCIAL

## 2019-05-22 PROCEDURE — H2035 A/D TX PROGRAM, PER HOUR: HCPCS | Mod: HQ

## 2019-05-29 ENCOUNTER — HOSPITAL ENCOUNTER (OUTPATIENT)
Dept: BEHAVIORAL HEALTH | Facility: CLINIC | Age: 29
End: 2019-05-29
Attending: SOCIAL WORKER
Payer: COMMERCIAL

## 2019-05-29 PROCEDURE — H2035 A/D TX PROGRAM, PER HOUR: HCPCS | Mod: HQ

## 2019-05-29 NOTE — PROGRESS NOTES
CD ADULT Progress Note     Treatment Plan Review completed on:  5/29     Attendance Dates: 5/29      Total # of Group Sessions:  25 group sessions for 48  Hours, plus 2 family session for 4 hours, plus 2 hour orientation/initial service/tx plan 1/29  Plus 1:1 on 3/18 for 1 hour and 1:1 on 3/25 for 1 hour     MONDAY TUESDAY WEDNESDAY THURSDAY FRIDAY SATURDAY SUNDAY Total   Group Therapy   2     2   Specialty Groups*           1:1           Family Program             [place]             Phase II             Absent           Total   2     5     *Specialty Groups include Mental Health Care, Assertiveness and Communication, Sobriety Maintenance Skills, Spiritual Care, Stress Management, Relapse Prevention, Family Systems.                    Learning Style:  Hands on    Staff member contributing:  Bethany Mjiares, MS, LADC, LPC  Fabi Giles, Intern    Received supervision:  No    Client:  contributed to goals and plan    Did Client receive a copy of treatment plan/revised plan:  Yes    Changes to Treatment Plan:  No    Client agrees with plan/revised plan:  Yes    Any changes in Vulnerable Adult Status:  No    Substance Use Disorders:  Alcohol Use Disorder Severe (F10.20)       ) Care Coordination Activities:  None  2) Medical, Mental Health and other appointments the client attended: No  3) Medication issues: Thanh reports not taking any medications  4) Physical and mental health problems:  Thanh is schedule to see his therapist in three weeks  5) Review and evaluation of the individual abuse prevention plan: Does not apply    ASAM Risk Ratings and Data       DIMENSION 1: Acute Intoxication/Withdrawal  The client's ability to cope with withdrawal symptoms and current state of intoxication       Acute Intoxication/Withdrawal - Current Risk Factor:  0    Reporting sober date of 3/12    Goals:  Remain abstinent or report any use    Data:  On 5/29: Thanh reports his last use of 3/12/19. No goals worked on in this  dimension. He shows no signs of use or withdrawal.    DIMENSION 2:  Biomedical Conditions and Complaints  The degree to which any physical disorder would interfere with treatment for substance abuse and the client's ability to tolerate any related discomfort     Biomedical Conditions and Complaints - Current Risk Factor:  1    Goals: regular medical check up    Data: On 5/29 no goals worked on in this dimension.    DIMENSION 3:  Emotional/Behavioral/Cognitive Conditions and Complications  The degree to which any condition or complications are likely to interfere with treatment for substance abuse or with function in significant life areas and the likelihood of risk of harm to self or others.     Emotional/Behavioral - Current Risk Factor:  2    DSM-5 Diagnoses:   per client anxiety disorder       Goals: Take pHQ-9 and JOSE L-7              Take medications as directed               Healthy coping for anxiety    Data: On 5/29 Thanh reported his anxiety at a 2, depression at a 0 and stress at 0 on a scale of 1= very little and 10=high. No goals worked on in this dimension.     DIMENSION 4:  Readiness to Change  Consider the amount of support and encouragement necessary to keep the client involved in treatment.     Readiness to Change - Current Risk Factor:  1    Goals:  Continue to find internal motivation for change    Data:   D: On 5/29 Thanh reported that he believes he has the qualities he needs to live well, is able to laugh at himself, and respects himself. He reported that he struggles with loving and supporting himself regardless of what happens. He reported this is due to not wanting to feel complacent or losing his drive to continue achieving goals. Thanh reported that he continues to remain busy to avoid triggers and cravings.  I: On 5/29 facilitated discussion on self-esteem. Discussed various ways that Thanh can continue to build his self-esteem without feeling complacent.  A: Thanh verbalizes that he is aware of the  "path of recovery and understands the importance of staying on the path even with no fast results. Thanh continues to actively participate in group discussion and provides valuable feedback for the other group members. Thanh recognizes that boredom leads to triggers and cravings for him.  P: Thanh states he will continue to stay on the recovery path. Thanh will continue to identify sitations with challenged conversations and address those challenges.    D: On 5/22 Thanh discussed being in a place where he feels he is able to continue moving forward with his recovery. He had previously stated that he needed to feel a physical consequence and reported today that he is not feeling that way any longer and is happy that he indeed has not had the \"physcial\" consequence that he spoke of. Thanh reported that he failed a certification test earlier this week but chose not to let it bother him and has already begun preparing for the next scheduled test. Thanh reported \"failure is just another chance at success\". During a discussion about self-esteem, Thanh reported that he has a fear of complacency.   I: On 5/22 acknowledged the visible changes in Thanh's affect while attending group. Discussed actions that can be taken to improve self-esteem such as acknowledging strengths.  A: Thanh's appearance and participation in group continue to improve. He continues to provide valuable feedback and accepts feedback that is given to him. Thanh continues to verbalize that his recovery journey is for himself.  P: Thanh reports that he needs to continue working on initiating the conversations with his wife that allow him to be vulnerable.    D: On 5/15 Thanh participated in a discussion about assertiveness. He reported that historically if things have not gone his way, he goes into \"screw it\" mode. Thanh reported that the family sessions he attended with his wife were beneficial and he demonstrates awareness of needing to talk about his feelings. Thanh reported that he " has had a few stressful days recently and has been constantly on the go.  I: Discussed with Thanh the improvements he has made in increasing his flexibility of being open to working on his communication skills. Acknowledged the importance of Thanh taking time for himself.  A: Thanh demonstrates increased awareness of his assertiveness and the changes that he needs to make to maintain his recovery. Thanh verbalizes that he wants recovery for himself, not for someone else.  P: Thanh will continue to work on discussing his feelings with his wife and starting those conversations.     DIMENSION 5:  Relapse/Continued Use/Continued Problem Potential  Consider the degree to which the client recognizes relapse issues and has the skills to prevent relapse of either substance use or mental health problems.     Relapse/Continued Use/Continued Problem Potential - Current Risk Factor:  2    Goals:  Plan for coping with and maintaining sobriety    Data:Week of 5/22, no goals worked on in this dimension.    DIMENSION 6:  Recovery Environment  Consider the degree to which key areas of the client's life are supportive of or antagonistic to treatment participation and recovery.     Recovery Environment - Current Risk Factor:  2    Support group attended this week:  No    Did family agree to attend family week:  No    If yes:  none schedule this week    Goals:  Build sober structure and support    Data: On 5/29 Thanh reports attending AA this week and connecting with boris and his dad who he identifies as a supportive person in his life. He said he appreciates the resources handed out and will keep them in mind, but feels he will continue AA and it is a good program for him.    On 529 I gave client resources for AA, [employer] and a list of other resources.  Discussed I know he has been going to AA but these can be something he keeps in folder.  A) He seems to like AA and yet is open to keeping resources in case they come in handy down the  road.  He demonstrates oppenness to new information and healthy coping by attending AA regularly.  P) Thanh will continue going to AA and connecting with his sponsor, regularly.         Goals and interventions 5/29 Self esttem, AA and non AA resources   wk of 5/15 family week attendance, assertiveness  5/6 Step one  4/29 family week  4/22  assertiveness, family week discussion  Bethany Mijares, MS, LADC, LPC

## 2019-06-03 ENCOUNTER — HOSPITAL ENCOUNTER (OUTPATIENT)
Dept: BEHAVIORAL HEALTH | Facility: CLINIC | Age: 29
End: 2019-06-03
Attending: SOCIAL WORKER
Payer: COMMERCIAL

## 2019-06-03 PROCEDURE — H2035 A/D TX PROGRAM, PER HOUR: HCPCS | Mod: HQ

## 2019-06-03 NOTE — PROGRESS NOTES
Nolan Mayo  [id number]               Adult CD Progress Note and Treatment Plan Review     Attendance     Monday    Group Date: 6/1/19   Group Attendance Attended   Group Therapy Type Addiction   Group Topic Covered Coping Skills/Lifestyle Management and Relapse Prevention   Client's Response To Topic Discussed personal experience with topic. Expressed readiness to alter behaviors.   Client Participation Detail Highly involved   Attendance 2.0 Hours   Individual Attendance None   Family Attendance None   Other Comments/Information None      Wednesday    Group Date: ................6/3/2019   Group Attendance Attended group session   Group Therapy Type Addiction   Group Topic Covered Mindfulness and Relapse Prevention   Client's Response To Topic Cooperative with task. Discussed personal experience with topic. Listened actively.   Client Participation Detail Highly involved   Attendance 2.0 Hours   Individual Attendance 2 Hours   Family Attendance None   Other Comments/Information None       Total # of Phase 1 Group Sessions: 18 for 35 hours     Total # of Phase 2 Group Sessions: 10 for 20 hours, plus 2 sessions of family for 4 hours  Total # of Phase 3 Group Sessions: None  Total # of 1:1 Sessions: 3, plus initial service for 2 hours    Support group attended this week: yes    Reporting sobriety: Yes    Treatment Plan Review     Treatment Plan Review completed on:  6/3/2019     Projected discharge date: 9/11/19    Client preferred learning style: Hands on    Staff member(s) contributing: SERJIO Deng, Fabi Lind    Received supervision: Yes (explain) - previously.    Client involvement with treatment planning: contributed to goals and plan.    Client received copy of plan/revised plan: Yes    Client agrees with plan/revised plan: Yes    Changes to Treatment Plan: No    Client's Dimension 1 Goal(s) Goals: regular medical check up     Goal not addressed this week.   Client's Dimension 2 Goal(s)  Goals: regular medical check up     Goal not addressed this week.   Client's Dimension 3 Goal(s) Take pHQ-9 and JOSE L-7                Take medications as directed                 Healthy coping for anxiety     Goal not addressed this week. 2  Goal not addressed this week.  Goal not addressed this week.   Client's Dimension 4 Goal(s) Continue to find internal motivation for change     Goal not addressed this week.   Client's Dimension 5 Goal(s) Plan for coping with and maintaining sobriety     Goal not addressed this week.   Client's Dimension 6 Goal(s) Build sober structure and support     Goal not addressed this week.     New Goals added since last review: None.    Goal(s) worked on since last review: gain skills and motivation for ongoing recovery    Strategies effective: Yes      1) Care Coordination Activities:  None  2) Medical, Mental Health and other appointments the client attended: No  3) Medication issues: Thanh reports not taking any medications  4) Physical and mental health problems:  Thanh is schedule to see his therapist in 2 weeks  5) Review and evaluation of the individual abuse prevention plan: The program's individual abuse prevention plan (IAPP) is sufficient for this client.       Substance Use Disorders:    303.90 (F10.20) Alcohol Use Disorder Severe    ASAM Risk Rating: (Note the rationale for risk rating changes)    Dimension 1 0 No changes    Dimension 2 0 no changes    Dimension 3 2 no changes    Dimension 4 0 demonstrates motivation for change by regularly showing up and participatin.      Dimension 5 2 no changes      Dimension 6 2 no changes    Data: (Need to include short narrative for the week on what was worked on)  offered feedback good insight client did actively participate  On Monday Thanh presented his personal recovery plan to the group discussing how rashardling, attending tx, going to AA, spending time with sponsor and using the communication skills he is learning, are all adding to his  recovery.  He presented all 3 pages and said it was a good assignment to do and he appreciated group feedback.  He expressed willingness to attend 1:1 session to discuss progress. On Wednesday, Thanh gave feedback to group member completing treatment.    Intervention:   Counselor feedback  Group feedback  Relapse prevention  On Monday I talked with him about his plan.  Gave feedback, including: analogy of octopus having 8 legs to that of treatment and finding 8 legs of support and main parts of working a plan.  I told him he was doing a great job of building the legs and using the things he knows are helpful to him.  On Wednesday, I asked Thanh to attend 1:1 session next week to update and discuss anything needed.    Assessment:    Stages of Change Model  Preparation/Determination    Appears/Sounds:  Cooperative  Motivated  Engaged  Thanh demonstrates willingness to grow, continue learning about self and building a plan for ongoing recovery  Plan:   -Client will attend all weekly Phase 2 group sessions.   -Client to engage in sober activities each week.  -Client will meet with this counselor 1:1 next week.   Continue to give feedback and use appropriate  feedback for growth    Bethany Mijares, SERJIO, LPC, MS    Week of 6/3

## 2019-06-05 ENCOUNTER — HOSPITAL ENCOUNTER (OUTPATIENT)
Dept: BEHAVIORAL HEALTH | Facility: CLINIC | Age: 29
End: 2019-06-05
Attending: SOCIAL WORKER
Payer: COMMERCIAL

## 2019-06-05 PROCEDURE — H2035 A/D TX PROGRAM, PER HOUR: HCPCS | Mod: HQ

## 2019-06-05 NOTE — ADDENDUM NOTE
Encounter addended by: Bethany Mijares LADC on: 6/5/2019 3:12 PM   Actions taken: Sign clinical note

## 2019-06-10 ENCOUNTER — HOSPITAL ENCOUNTER (OUTPATIENT)
Dept: BEHAVIORAL HEALTH | Facility: CLINIC | Age: 29
End: 2019-06-10
Attending: SOCIAL WORKER
Payer: COMMERCIAL

## 2019-06-10 PROCEDURE — H2035 A/D TX PROGRAM, PER HOUR: HCPCS | Mod: HQ

## 2019-06-10 PROCEDURE — H2035 A/D TX PROGRAM, PER HOUR: HCPCS

## 2019-06-10 NOTE — PROGRESS NOTES
D: On 6/10 30 minute individual session with Thanh. Discussed the plan of moving from phase 2 to phase 3. Thanh reported that he feels ready to transition to phase 3 and is looking forward to have a little less on his full plate. Thanh reported that he is no longer taking his Zoloft. Thanh reported that he would like to continue focusing on self-care topics to broaden his resource list.   I: Checked in with Thanh regarding his individual therapy sessions. Discussed modifications of goals with Thanh, and explained he had some time to think about new goals he may want to work on when he enters phase 3.  A: Thanh expresses knowledge on skills he has gained while in treatment and demonstrates that he can utilize those skills.  P: Thanh will transition to phase 3 on July 10th and continue attending twice per week until then.    Fabi Giles, Intern
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
## 2019-06-12 ENCOUNTER — HOSPITAL ENCOUNTER (OUTPATIENT)
Dept: BEHAVIORAL HEALTH | Facility: CLINIC | Age: 29
End: 2019-06-12
Attending: SOCIAL WORKER
Payer: COMMERCIAL

## 2019-06-12 PROCEDURE — H2035 A/D TX PROGRAM, PER HOUR: HCPCS | Mod: HQ

## 2019-06-17 ENCOUNTER — HOSPITAL ENCOUNTER (OUTPATIENT)
Dept: BEHAVIORAL HEALTH | Facility: CLINIC | Age: 29
End: 2019-06-17
Attending: SOCIAL WORKER
Payer: COMMERCIAL

## 2019-06-17 PROCEDURE — H2035 A/D TX PROGRAM, PER HOUR: HCPCS | Mod: HQ

## 2019-06-17 NOTE — PROGRESS NOTES
Nolan Mayo  [id number]               Adult CD Progress Note and Treatment Plan Review     Attendance       Monday    Group Date: 6/17/19   Group Attendance Attended group session   Group Therapy Type Addiction   Group Topic Covered Disease of Addiction/Choices in Recovery   Client's Response To Group Topic Confronted peers appropriately. Cooperative with task. Discussed personal experience with topic.   Client Group Participation Detail Highly involved   Group Attendance (Time) 2.0 Hours   Individual Attendance None   Family Attendance None   Other Comments/Information None      Wednesday    Group Date: 6/19/19   Group Attendance Attended group session   Group Therapy Type Addiction   Group Topic Covered Coping Skills/Lifestyle Management, Relapse Prevention and Thinking Errors/Negative Self-Talk   Client's Response To Group Topic Cooperative with task. Discussed personal experience with topic. Expressed readiness to alter behaviors.   Client Group Participation Detail Highly involved   Group Attendance (Time) 2.0 Hours   Individual Attendance None   Family Attendance None   Other Comments/Information None     Total # of Phase 1 Group Sessions: 18 for 35 hours                                  Total # of Phase 2 Group Sessions: 14 for 28 hours, plus 2 sessions of family for 4 hours  Total # of Phase 3 Group Sessions: None  Total # of 1:1 Sessions: 4, plus initial service for 2 hours     Support group attended this week: Yes     Reporting sobriety: Yes- 3/12/19    Treatment Plan Review     Treatment Plan Review completed on:  6/19/2019      Projected discharge date: 9/11/19     Client preferred learning style: Hands on     Staff member(s) contributing: SERJIO Deng, Fabi Giles     Received supervision: Yes (explain) - previously.     Client involvement with treatment planning: contributed to goals and plan.     Client received copy of plan/revised plan: Yes     Client agrees with plan/revised  plan: Yes     Changes to Treatment Plan: No    Client's Dimension 1 Goal(s) Maintain sobriety & report use       Goal not addressed this week.   Client's Dimension 2 Goal(s) Goals: regular medical check up       Goal not addressed this week.   Client's Dimension 3 Goal(s) Take pHQ-9 and JOSE L-7                 Take medications as directed                  Healthy coping for anxiety       See Below   Client's Dimension 4 Goal(s) Continue to find internal motivation for change       See Below   Client's Dimension 5 Goal(s) Plan for coping with and maintaining sobriety       See below   Client's Dimension 6 Goal(s) Build sober structure and support       See Below       New Goals added since last review: None.    Goal(s) worked on since last review: Dimension 3, 4, and 5    Strategies effective: Yes    Care Coordination Activities: None.    Medical, Mental Health and other appointments the client attended: None. Therapy scheduled for 6/19    Medication issues: None.    Physical and mental health problems: None.    Review and evaluation of the individual abuse prevention plan: The program's individual abuse prevention plan (IAPP) is sufficient for this client.     Substance Use Disorders:    303.90 (F10.20) Alcohol Use Disorder Severe    ASAM Risk Rating: (Note the rationale for risk rating changes)    Dimension 1 0- No changes, Thanh reports last use 3/12    Dimension 2 0- no changes    Dimension 3 1- No changes Thanh rates his anxiety at 1 out of 10, depression at 2 out of 10, and stress at 0 out of 10    Dimension 4 0- No changes Thanh rates his motivation for sobriety at 9 out of 10    Dimension 5 2- Thanh rates his cravings at a 2 over the last week     Dimension 6 2- Thanh reports attending AA this week        Data: (Need to include short narrative for the week on what was worked on)  offered feedback good insight client did actively participate     On 6/17 Thanh participated in a discussion about recognizing what his  "tolerance, withdrawal, and time spent on use history was like. Thanh reports that he is interested in learning more about the philosophy and spirituality behind recovery. Thanh discussed the 4th stage of drinking and explained how trapped he felt when he was using and felt that he would not be able to function without using. Thanh discussed looking back over the symptoms of addiction that he realizes he is \"powerless\" over his addiction and knows the importance of working a program. On 6/19 Thanh reflected on triggers, warnings, and relapse strategies while completing the hole int he sidewalk assignment. Thanh reported that anger and racing thoughts cause him to fall into the \"hole\". Once fallen into the hole, Thanh reports that he would isolate himself. Thanh reported that he used to see himself as a victim within his addiction and no longer feels that way. He reported that he is \"content\" with being able to move forward and stated that he \"never wants to drink again\".    Intervention:   Counselor feedback  Education  Group feedback    On 6/17 discussed how mindfulness can be used in positive situations and we may be unaware of it, and try to recognize those moments and get into the habit of using it. Counselor provided education on PAWS and discussed the various stages of drinking. Counselor provided resources for some outside community support events. Acknowledged Thanh's efforts of continuing to work a program even when he may not identify with the topic being discussed. On 6/19 counselor commended Thanh on the growth he has had while attending treatment. Thanh received positive feedback regarding progress made in treatment from group members.    Assessment:    Stages of Change Model  Action    Appears/Sounds:  Cooperative  Motivated  Engaged    Thanh engaged in group discussion about what he wants for his recovery and what he is willing to do to achieve it. Thanh provided feedback for his group members and reported that he enjoys " hearing new ideas in group.      Plan:   -Continue to attend 2-3 sober support group meetings each week (this includes non-12-step/AA alternative meetings).  -Client will continue to work on treatment plan objectives.  -Client will attend all weekly Phase 2 group sessions.   -Client to engage in sober activities each week.   We will discuss more on spirituality.        Fabi Mijares, MS, LADC. LPC

## 2019-06-19 ENCOUNTER — HOSPITAL ENCOUNTER (OUTPATIENT)
Dept: BEHAVIORAL HEALTH | Facility: CLINIC | Age: 29
End: 2019-06-19
Attending: SOCIAL WORKER
Payer: COMMERCIAL

## 2019-06-19 PROCEDURE — H2035 A/D TX PROGRAM, PER HOUR: HCPCS | Mod: HQ

## 2019-06-24 ENCOUNTER — HOSPITAL ENCOUNTER (OUTPATIENT)
Dept: BEHAVIORAL HEALTH | Facility: CLINIC | Age: 29
End: 2019-06-24
Attending: SOCIAL WORKER
Payer: COMMERCIAL

## 2019-06-24 PROCEDURE — H2035 A/D TX PROGRAM, PER HOUR: HCPCS | Mod: HQ

## 2019-06-26 ENCOUNTER — HOSPITAL ENCOUNTER (OUTPATIENT)
Dept: BEHAVIORAL HEALTH | Facility: CLINIC | Age: 29
End: 2019-06-26
Attending: SOCIAL WORKER
Payer: COMMERCIAL

## 2019-06-26 PROCEDURE — H2035 A/D TX PROGRAM, PER HOUR: HCPCS | Mod: HQ

## 2019-07-01 ENCOUNTER — HOSPITAL ENCOUNTER (OUTPATIENT)
Dept: BEHAVIORAL HEALTH | Facility: CLINIC | Age: 29
End: 2019-07-01
Attending: SOCIAL WORKER
Payer: COMMERCIAL

## 2019-07-01 NOTE — PROGRESS NOTES
Nolan Mayo  [id number]               Adult CD Progress Note and Treatment Plan Review     Attendance     Monday    Group Date: 7/1   Group Attendance Attended group session   Group Therapy Type Addiction and Other - spirituality   Group Topic Covered Emotions/Expression/Feelings   Client's Response To Group Topic Cooperative with task. Discussed personal experience with topic. Expressed readiness to alter behaviors.   Client Group Participation Detail Highly involved   Group Attendance (Time) 2.0 Hours   Individual Attendance 2 Hours   Family Attendance None   Other Comments/Information None      Wednesday    Group Date: 7/3   Group Attendance Excused from group session   Group Therapy Type Other - NONE, absent   Group Topic Covered NONE, absent   Client's Response To Group Topic Other - was not there.   Client Group Participation Detail Other - NONE, NOT THERE   Group Attendance (Time) Other - NONE   Individual Attendance None   Family Attendance None   Other Comments/Information None       Total # of Phase 1 Group Sessions:18 for 35    Total # of Phase 2 Group Sessions: 16 for 32 hours plus 2 sessions of family for 4 hours  Total # of Phase 3 Group Sessions:NONE yet   Total # of 1:1 Sessions: 4, plus initual service for 2 hours    Support group attended this week: yes    Reporting sobriety: Yes    Treatment Plan Review   Treatment Plan Review completed on:  6/26/2019      Projected discharge date: 9/11/19     Client preferred learning style: Hands on     Staff member(s) contributing: SERJIO Deng, Fabi Giles     Received supervision: Yes (explain) - previously.     Client involvement with treatment planning: contributed to goals and plan.     Client received copy of plan/revised plan: Yes     Client agrees with plan/revised plan: Yes     Changes to Treatment Plan: No     Client's Dimension 1 Goal(s) Maintain sobriety & report use       Goal not addressed this week.   Client's Dimension 2  "Goal(s) Goals: regular medical check up       Goal not addressed this week.   Client's Dimension 3 Goal(s) Take pHQ-9 and JOSE L-7                 Take medications as directed                  Healthy coping for anxiety       See below   Client's Dimension 4 Goal(s) Continue to find internal motivation for change       Goal not addressed   Client's Dimension 5 Goal(s) Plan for coping with and maintaining sobriety       Goal not addressed   Client's Dimension 6 Goal(s) Build sober structure and support       See Below         New Goals added since last review: None.     Goal(s) worked on since last review: Dimension  3,6     Strategies effective: Yes     Care Coordination Activities: None.     Medical, Mental Health and other appointments the client attended: he reports Nonethis week\"   Medication issues: None.     Physical and mental health problems: \"None\"     Review and evaluation of the individual abuse prevention plan: The program's individual abuse prevention plan (IAPP) is sufficient for this client.        Substance Use Disorders:    303.90 (F10.20) Alcohol Use Disorder Severe    ASAM Risk Rating: (Note the rationale for risk rating changes)    Dimension 1 0 No changes, Ham reports last use as 3/12    Dimension 2 0 no changes    Dimension 3 1 No changes, Thanh rates his anxiety at 1 of 10, depression at 2 of 10 and stress 1 of 10.    Dimension 4 0 No changes    Dimension 5 2 Thanh rates his cravings at a 1 over this last week      Dimension 6 1 Thanh reports attending AA this week, he also reports speaking with his sponsor    Data: (Need to include short narrative for the week on what was worked on)  offered feedback good insight client did actively participate  On 7/1, Thanh participated in a discussion about his spiritual life.  Said he is willing to keep deepening spiritual life.  Gave examples  of \"positive vs negative\" spirituality in life.   He reported he liked the concepts of \"positve vs negative\" as a way to " "explore moving in a healthy direction. Said moving from self-pity to gratitude is one he has been working on regularly.  'I used to have so much self pity\"   Client discussed feelings exercise from Feelings Journal.  He said he did not come from a family where feelings were openly discussed, but is beginning to understand what an influence feelings have on choices and that using was a way to not have to deal with feelings.  Discussed not wanting to keep covering up feelings and being more willing to talk about things.Reports understanding feelings are a huge trigger for use, when unaware    Intervention:   Behavior modification  Cognitive Behavioral Therapy  Counselor feedback  Education  Relapse prevention  On 7/1I  talked with client about his  perception of Muslim and spirituality.  I discussed the \"positive\" of a recoverying person, regardless of their Muslim, being able to move in a spiritual direction.  So Moving form Fear to Trust, Self-Pity to Gratitude, Resentment to Acceptance and Dishonesty to Honesty.I emphasized that one always has Choice of where to focus.  I also discussed feelings as guides in life.  Encouraged ongoing awareness of feelings and checking in regularly with his feelings.  Discussed that feelings are neither good or bad, that they are trying to tell us things.  Assessment:    Stages of Change Model  Preparation/Determination    Appears/Sounds:  Cooperative  Motivated  Engaged  Seems to like the way concepts were presented in regard to \"positive vs negative spirituality\".  Is understanding the connection of being able to make good choices when aware of feelings, and how feelings can be guides  Plan:  - Client will complete assignment on \"Understanding Spirituality\".  Reports \"I will keep in mind\" the concepts of positive vs negative spirituality, as I explore my spirituality and continue to grow.  -Client to engage in sober activities each week.  -Client will continue assignemtns in " Feeling journal.    Bethany Mijares, SERJIO, LPC, MS

## 2019-07-03 NOTE — ADDENDUM NOTE
Encounter addended by: Bethany Mijares LADC on: 7/3/2019 12:22 PM   Actions taken: Sign clinical note

## 2019-07-10 ENCOUNTER — HOSPITAL ENCOUNTER (OUTPATIENT)
Dept: BEHAVIORAL HEALTH | Facility: CLINIC | Age: 29
End: 2019-07-10
Attending: SOCIAL WORKER
Payer: COMMERCIAL

## 2019-07-10 PROCEDURE — H2035 A/D TX PROGRAM, PER HOUR: HCPCS | Mod: HQ

## 2019-07-17 ENCOUNTER — HOSPITAL ENCOUNTER (OUTPATIENT)
Dept: BEHAVIORAL HEALTH | Facility: CLINIC | Age: 29
End: 2019-07-17
Attending: SOCIAL WORKER
Payer: COMMERCIAL

## 2019-07-17 PROCEDURE — H2035 A/D TX PROGRAM, PER HOUR: HCPCS | Mod: HQ

## 2019-07-17 NOTE — PROGRESS NOTES
Nolan Mayo  [id number]               Adult CD Progress Note and Treatment Plan Review     Attendance       Wednesday    Group Date: 7/17/19   Group Attendance Attended group session   Group Therapy Type Addiction   Group Topic Covered Spirituality   Client's Response To Group Topic Cooperative with task. Expressed readiness to alter behaviors. Expressed understanding of topic. Listened actively.   Client Group Participation Detail Highly involved   Group Attendance (Time) 2.0 hours   Individual Attendance None   Family Attendance None   Other Comments/Information None        Total # of Phase 1 Group Sessions:18 for 35                      Total # of Phase 2 Group Sessions: 16 for 32 hours plus 2 sessions of family for 4 hours  Total # of Phase 3 Group Sessions: 2 for 4 hours  Total # of 1:1 Sessions: 4, plus initual service for 2 hours    Support group attended this week: yes    Reporting sobriety: Yes    Treatment Plan Review     Treatment Plan Review completed on:  7/17/2019     Projected discharge date: 9/11/19    Client preferred learning style: Hands on     Staff member(s) contributing: Bethany Mijares, SERJIO, Fabi Giles     Received supervision: Yes (explain) - previously.     Client involvement with treatment planning: contributed to goals and plan.     Client received copy of plan/revised plan: Yes     Client agrees with plan/revised plan: Yes     Changes to Treatment Plan: No    Client's Dimension 1 Goal(s) Maintain sobriety & report use       Goal not addressed this week.   Client's Dimension 2 Goal(s) Goals: regular medical check up       Goal not addressed this week.   Client's Dimension 3 Goal(s) Take pHQ-9 and JOSE L-7                 Take medications as directed                  Healthy coping for anxiety       See below   Client's Dimension 4 Goal(s) Continue to find internal motivation for change       Goal not addressed   Client's Dimension 5 Goal(s) Plan for coping with and  "maintaining sobriety       See Below   Client's Dimension 6 Goal(s) Build sober structure and support       See Below         New Goals added since last review: None.     Goal(s) worked on since last review: Dimension  3,6     Strategies effective: Yes     Care Coordination Activities: None.     Medical, Mental Health and other appointments the client attended: None    Medication issues: None.     Physical and mental health problems: \"None\"     Review and evaluation of the individual abuse prevention plan: The program's individual abuse prevention plan (IAPP) is sufficient for this client.         Substance Use Disorders:    303.90 (F10.20) Alcohol Use Disorder Severe    ASAM Risk Rating: (Note the rationale for risk rating changes)    Dimension 1 0- On 7/17 Thanh reports last use as 3/12    Dimension 2 0- On 7/17 no changes reported    Dimension 3 1- On 7/17 No changes, Thanh rates his anxiety at 2 of 10, depression at 0 of 10 and stress 0 of 10. He reports \"No thoughts of self harm\"     Dimension 4 0- On 7/17 Thanh reports his motivation for sobriety is 9/10.    Dimension 5 1- On 7/17 Thanh rates his cravings at a 1 over this last week.  He is completing assignmentsm remaining sober    Dimension 6 1- On 7/17 Thanh reports attending AA this week, he also reports speaking with his sponsor    Data: (Need to include short narrative for the week on what was worked on)  offered feedback good insight client did participate  On 7/17 Thanh participated in a discussion about  what spirituality means to him. Discussed positive spirituality and provided examples for gaining trust, showing gratitude, learning acceptance, and praciting honesty. Thanh presented assignment on what his relapse would look like. Thanh reported that from completing this assignment he gained \"a clearer picture for what I need to do moving forward and the warnings I need to be aware of in order to eliminate the threat through mindfulness\". A peer asked Thanh if he " enjoyed being sober and Thanh reported he did. Thanh reported that he has been invited to a cabin with people around his age and he is either going to bring his wife with him or not attend.    Intervention:   Counselor feedback  Education  On 7/17 provided Thanh  with 12 step alternative handout to go along with spirituality topic. Discussed ways to free self, grow, and a find a source that guides and support recovery with spirituality. Provided education regarding how unexpressed feelings become expressed one way or another. Discussed the continued growth Thanh has had since beginning treatment. Counselor and Group gave Thanh feedback on his assignment saying he is growing and gaining so much insight into his patterns and how to cope with them.      Assessment:    Stages of Change Model  Action    Appears/Sounds:  Cooperative  Motivated  Engaged     Thanh demonstrates growth in recovery by sharing his experiences and the insight he has gained. Thanh openly provides feedback to his peers and accepts feedback from them.  He shows growth and motivation for long term sobriety      Plan:   -Thanh said he will use what he learned from this assignment to deepen his support and structure for long term sobriety  -Client will attend all weekly Phase 3 groups.   -Client to engage in sober activities each week.  -Client will continue to focus on ways to grow spiritually..      Wk of 7/17 spirituality, What my relapse would look like 7/10: Spirituality and self care 6/24: Warning signs and cues, sponsor.  6/17: The hole in the sidewalk, stages of drinking  Fabi Giles intern  Bethany Mijares, MS, LADC, LPC

## 2019-07-24 ENCOUNTER — HOSPITAL ENCOUNTER (OUTPATIENT)
Dept: BEHAVIORAL HEALTH | Facility: CLINIC | Age: 29
End: 2019-07-24
Attending: SOCIAL WORKER
Payer: COMMERCIAL

## 2019-07-24 PROCEDURE — H2035 A/D TX PROGRAM, PER HOUR: HCPCS | Mod: HQ

## 2019-07-24 NOTE — PROGRESS NOTES
Nolan Mayo  [id number]               Adult CD Progress Note and Treatment Plan Review     Attendance     Wednesday    Group Date: 7/24/19   Group Attendance Attended group session   Group Therapy Type Life skill(s)   Group Topic Covered Relapse Prevention   Client's Response To Group Topic Cooperative with task. Listened actively. Offered helpful suggestions to peers.   Client Group Participation Detail Shows interest   Group Attendance (Time) 2.0 Hours   Individual Attendance None   Family Attendance None   Other Comments/Information None     Total # of Phase 1 Group Sessions:18 for 35                      Total # of Phase 2 Group Sessions: 16 for 32 hours plus 2 sessions of family for 4 hours  Total # of Phase 3 Group Sessions: 3 for 6 hours  Total # of 1:1 Sessions: 4, plus initual service for 2 hours     Support group attended this week: yes     Reporting sobriety: Yes    Treatment Plan Review     Treatment Plan Review completed on:  7/24/2019     Projected discharge date: 9/11/19     Client preferred learning style: Hands on     Staff member(s) contributing: Bethany Mijares, SERJIO, Fabi Giles     Received supervision: Yes (explain) - previously.     Client involvement with treatment planning: contributed to goals and plan.     Client received copy of plan/revised plan: Yes     Client agrees with plan/revised plan: Yes     Changes to Treatment Plan: No    Client's Dimension 1 Goal(s) Maintain sobriety & report use   Report any Use    Goal not addressed this week.   Client's Dimension 2 Goal(s) Goals: regular medical check up       Goal not addressed this week.   Client's Dimension 3 Goal(s) Take pHQ-9 and JOSE L-7                 Take medications as directed                  Healthy coping for anxiety       See below   Client's Dimension 4 Goal(s) Continue to find internal motivation for change       Goal not addressed   Client's Dimension 5 Goal(s) Plan for coping with and maintaining  "sobriety       See Below   Client's Dimension 6 Goal(s) Build sober structure and support       See Below       New Goals added since last review: None.     Goal(s) worked on since last review: Dimension  3,6     Strategies effective: Yes     Care Coordination Activities: None.     Medical, Mental Health and other appointments the client attended: None     Medication issues: None.     Physical and mental health problems: \"None\"     Review and evaluation of the individual abuse prevention plan: The program's individual abuse prevention plan (IAPP) is sufficient for this client.         Substance Use Disorders:    303.90 (F10.20) Alcohol Use Disorder Severe    ASAM Risk Rating: (Note the rationale for risk rating changes)    Dimension 1 0- On 7/24 Thanh reports last use as 3/12    Dimension 2 0- On 7/24 no changes reported    Dimension 3 1- On 7/24 No changes, Thanh rates his anxiety at 3 of 10, depression at 0 of 10 and stress 0 of 10. He reports \"No thoughts of self harm\"      Dimension 4 0- On 7/24 Thanh reports his motivation for sobriety is 9/10.    Dimension 5 1- On 7/24 Thanh rates his cravings at a 2 over this last week.      Dimension 6 1- On 7/24 Thanh reports attending AA this week, he also reports speaking with his sponsor and his dad    Data: (Need to include short narrative for the week on what was worked on)  offered feedback client did participate  On 7/24 Thanh reported that he attended a weekend at a friends cabin which caused anxiety. He reported that sobriety I easier if you commit to it. Thanh provided feedback to a peer completing treatment today and stated things he has learned from that peer. Thanh reported that attending family group was the point in time where he realized that he wanted to be sober for himself and he reports his wife was able to understand why Thanh needed to do this for himself, not anyone else.    Intervention:   Counselor feedback  Group feedback  On 7/24 discussed relapse prevention plans " and incorporated Thanh's schedule into his recovery plan. Thanh provided feedback to group members regarding challenges shared.    Assessment:    Stages of Change Model  Action    Appears/Sounds:  Cooperative  Motivated  Engaged    Thanh remains engaged with group and the discussions that take place. Thanh appears motivated to maintain sobriety by attending and utilizing his sober support network.    Plan:   -Continue to attend 2-3 sober support group meetings each week (this includes non-12-step/AA alternative meetings).  -Client will attend all weekly Phase 3 groups.    -Continue open communication with his wife.     Wk of 7/17 spirituality, What my relapse would look like 7/10: Spirituality and self care 6/24: Warning signs and cues, sponsor.  6/17: The hole in the sidewalk, stages of drinking    Fabi Giles, intern    Bethany Mijares, MS, LADC. LPC

## 2019-07-29 NOTE — PROGRESS NOTES
Nolan Mayo  [id number]               Adult CD Progress Note and Treatment Plan Review     Attendance     Wednesday    Group Date: 7/31/19   Group Attendance Attended group session   Group Therapy Type Life skill(s)   Group Topic Covered Grief & Loss   Client's Response To Group Topic Discussed personal experience with topic. Listened actively. Offered helpful suggestions to peers.   Client Group Participation Detail Shows interest   Group Attendance (Time) 2.0 Hours   Individual Attendance None   Family Attendance None   Other Comments/Information None       Total # of Phase 1 Group Sessions:18 for 35                      Total # of Phase 2 Group Sessions: 16 for 32 hours plus 2 sessions of family for 4 hours  Total # of Phase 3 Group Sessions: 4 for 8 hours  Total # of 1:1 Sessions: 4, plus initual service for 2 hours     Support group attended this week: yes     Reporting sobriety: Yes    Treatment Plan Review     Treatment Plan Review completed on:  7/29/2019     Projected discharge date: 9/11/19     Client preferred learning style: Hands on     Staff member(s) contributing: SERJIO Deng, Fabi Giles     Received supervision: Yes (explain) - previously.     Client involvement with treatment planning: contributed to goals and plan.     Client received copy of plan/revised plan: Yes     Client agrees with plan/revised plan: Yes     Changes to Treatment Plan: No    Client's Dimension 1 Goal(s) Maintain sobriety & report use   Report any Use    Goal not addressed this week.   Client's Dimension 2 Goal(s) Goals: regular medical check up       Goal not addressed this week.   Client's Dimension 3 Goal(s) Take pHQ-9 and JOSE L-7                 Take medications as directed                  Healthy coping for anxiety       See below   Client's Dimension 4 Goal(s) Continue to find internal motivation for change       Goal not addressed   Client's Dimension 5 Goal(s) Plan for coping with and  "maintaining sobriety       See Below   Client's Dimension 6 Goal(s) Build sober structure and support       See Below         New Goals added since last review: None.     Goal(s) worked on since last review: Dimension  3,6     Strategies effective: Yes     Care Coordination Activities: None.     Medical, Mental Health and other appointments the client attended: Therapy scheduled for next week.     Medication issues: None.     Physical and mental health problems: \"None\"     Review and evaluation of the individual abuse prevention plan: The program's individual abuse prevention plan (IAPP) is sufficient for this client.         Substance Use Disorders:    303.90 (F10.20) Alcohol Use Disorder Severe    ASAM Risk Rating: (Note the rationale for risk rating changes)    Dimension 1 0- On 7/31 Thanh reports last use as 3/12    Dimension 2 0- On 7/31 no changes reported    Dimension 3 1- On 7/31 No changes, Thanh rates his anxiety at 1 of 10, depression at 0 of 10 and stress 1 of 10. He reports \"No thoughts of self harm\"      Dimension 4 0- On 7/31 Thanh reports his motivation for sobriety is 9/10.    Dimension 5 1- On 7/31 Thanh rates his cravings at a 1 over this last week.      Dimension 6 1- On 7/31 Thanh reports attending AA this week, he also reports speaking with his sponsor and his dad    Data: (Need to include short narrative for the week on what was worked on)  offered feedback good insight client did participate  On 7/31 Thanh completed personal loss inventory and discussed how the grief/loss of death of loved ones and past failures have affected him and his use. Thanh reported that while using during a period of grief he would have self pity and discussed how that was an ineffective tool. Thanh reported that what has been effective for him during a period of grief is having gratitude. Thanh reports feeling steady even though he has a 60 hour work week this week he does not feel stressed about it.    Intervention:   Counselor " "feedback  Group feedback    On 7/31 counselor read an article titled \"Grief is part of accepting loses, changes\" and discussed loses and changes that have happened in Thanh's life. Discussed the process Thanh has taken to deal with feelings of grief in unhealthy and healthy ways.    Assessment:    Stages of Change Model  Action    Appears/Sounds:  Cooperative  Engaged    Thanh appears calm and relaxed. He presents to group with a smile and is open to sharing that things have been going well for him. He appears to continue to use mindfulness and accept being in the present moment, even when nothing is happening.    Plan:   -Continue to attend 2-3 sober support group meetings each week (this includes non-12-step/AA alternative meetings).  -Client will attend all weekly Phase 3 groups.    - Utilize mindful tools during extra long work week in the event challenges arise.       Wk of: 7/31 grief and loss, 7/17 spirituality, What my relapse would look like 7/10: Spirituality and self care 6/24: Warning signs and cues, sponsor.  6/17: The hole in the sidewalk, stages of drinking    Fabi Giles"

## 2019-07-31 ENCOUNTER — HOSPITAL ENCOUNTER (OUTPATIENT)
Dept: BEHAVIORAL HEALTH | Facility: CLINIC | Age: 29
End: 2019-07-31
Attending: SOCIAL WORKER
Payer: COMMERCIAL

## 2019-07-31 PROCEDURE — H2035 A/D TX PROGRAM, PER HOUR: HCPCS | Mod: HQ

## 2019-08-07 ENCOUNTER — HOSPITAL ENCOUNTER (OUTPATIENT)
Dept: BEHAVIORAL HEALTH | Facility: CLINIC | Age: 29
End: 2019-08-07
Attending: SOCIAL WORKER
Payer: COMMERCIAL

## 2019-08-07 PROCEDURE — H2035 A/D TX PROGRAM, PER HOUR: HCPCS | Mod: HQ

## 2019-08-07 NOTE — PROGRESS NOTES
Nolan Mayo  [id number]               Adult CD Progress Note and Treatment Plan Review     Attendance     Wednesday    Group Date: 8/7/19   Group Attendance Attended group session   Group Therapy Type Life skill(s)   Group Topic Covered Grief & Loss   Client's Response To Group Topic Discussed personal experience with topic. Expressed readiness to alter behaviors. Listened actively.   Client Group Participation Detail Highly involved   Group Attendance (Time) 2.0 Hours   Individual Attendance None   Family Attendance None   Other Comments/Information None     Total # of Phase 1 Group Sessions:18 for 35                      Total # of Phase 2 Group Sessions: 16 for 32 hours plus 2 sessions of family for 4 hours  Total # of Phase 3 Group Sessions: 5 for 10 hours  Total # of 1:1 Sessions: 4, plus initual service for 2 hours     Support group attended this week: yes     Reporting sobriety: Yes    Treatment Plan Review     Treatment Plan Review completed on:  8/7/2019     Projected discharge date: 9/11/19     Client preferred learning style: Hands on     Staff member(s) contributing: SERJIO Deng, Fabi Giles     Received supervision: Yes (explain) - previously.     Client involvement with treatment planning: contributed to goals and plan.     Client received copy of plan/revised plan: Yes     Client agrees with plan/revised plan: Yes     Changes to Treatment Plan: No    Client's Dimension 1 Goal(s) Maintain sobriety & report use   Report any Use    Goal not addressed this week.   Client's Dimension 2 Goal(s) Goals: regular medical check up       Goal not addressed this week.   Client's Dimension 3 Goal(s) Take pHQ-9 and JOSE L-7                 Take medications as directed                  Healthy coping for anxiety       See below   Client's Dimension 4 Goal(s) Continue to find internal motivation for change       Goal not addressed   Client's Dimension 5 Goal(s) Plan for coping with and  "maintaining sobriety       See Below   Client's Dimension 6 Goal(s) Build sober structure and support       See Below       New Goals added since last review: None.     Goal(s) worked on since last review: Dimension  3,6     Strategies effective: Yes     Care Coordination Activities: None.     Medical, Mental Health and other appointments the client attended: Therapy scheduled for next week.     Medication issues: None.     Physical and mental health problems: \"None\"     Review and evaluation of the individual abuse prevention plan: The program's individual abuse prevention plan (IAPP) is sufficient for this client.         Substance Use Disorders:    303.90 (F10.20) Alcohol Use Disorder Severe    ASAM Risk Rating: (Note the rationale for risk rating changes)    Dimension 1 0- On 8/7 Thanh reports last use as 3/12    Dimension 2 0- On 8/7 no changes reported    Dimension 3 1- On 8/7 No changes, Thanh rates his anxiety at 2 of 10, depression at 0 of 10 and stress 1 of 10. He reports \"No thoughts of self harm\"      Dimension 4 0- On 8/7 Thanh reports his motivation for sobriety is 9/10.    Dimension 5 1- On 8/7 Thanh rates his cravings at a 2 over this last week.      Dimension 6 1- On 8/7 Thanh reports attending AA this week, he also reports speaking with his sponsor and his dad    Data: (Need to include short narrative for the week on what was worked on)  offered feedback good insight client did participate    On 8/7 Thanh discussed the assignment \"what I wish someone had told me about grief\". Thanh shared a journal entry stating that feelings need to be felt so greater harm doesn't occur. He was able to tie this into his feelings of anger and being able to move forward with his sobriety for himself not always for someone else. Thanh reported a slight increase in cravings within the last week due to family dynamics. He also reported that life moves up and down and and sobriety makes it easier.    Intervention:   Counselor " feedback  Group feedback    On 8/7 discussed feelings of grief and the process of working through grief in healthy ways. Discussed with Thanh tentative discharge dates and his feelings around completing treatment. Thanh provided very personal feedback to a peer who completed the program today and expressed how much that individual has helped him.   I asked Thanh if he could meet for a 1:1 on Wed Aug 14.  He said he is able to    Assessment:    Stages of Change Model  Action    Appears/Sounds:  Cooperative  Motivated  Engaged    Thanh demonstrated a very vulnerable side to him today in group when providing final feedback to his peer. Thanh appears aware of the positive changes he has made in his life which affect his recovery.  He is showing maturity and commitment to his sobriety    Plan:   -Continue to attend 2-3 sober support group meetings each week (this includes non-12-step/AA alternative meetings).  -Client will continue to work on treatment plan objectives.  -Client will attend all weekly Phase 3 groups.   -Client will meet with this counselor for 1:1.         Wk of: 8/7: grief and loss, discuss tx completion 7/31 grief and loss, 7/17 spirituality, What my relapse would look like 7/10: Spirituality and self care 6/24: Warning signs and cues, sponsor.  6/17: The hole in the sidewalk, stages of drinking     Fabi Mijares, MS, [facility]. LPC

## 2019-08-14 ENCOUNTER — HOSPITAL ENCOUNTER (OUTPATIENT)
Dept: BEHAVIORAL HEALTH | Facility: CLINIC | Age: 29
End: 2019-08-14
Attending: SOCIAL WORKER
Payer: COMMERCIAL

## 2019-08-14 PROCEDURE — H2035 A/D TX PROGRAM, PER HOUR: HCPCS | Mod: HQ

## 2019-08-14 PROCEDURE — H2035 A/D TX PROGRAM, PER HOUR: HCPCS

## 2019-08-14 NOTE — PROGRESS NOTES
Nolan Mayo  [id number]               Adult CD Progress Note and Treatment Plan Review     Attendance       Wednesday    Group Date: 8/14   Group Attendance Attended group session   Group Therapy Type Addiction   Group Topic Covered Emotional Regulation and anxiety   Client's Response To Group Topic Cooperative with task. Expressed understanding of topic. Listened actively. Other - recalled past times and shared.   Client Group Participation Detail Highly involved   Group Attendance (Time) 2.0 Hours   Individual Attendance 30 Minutes   Family Attendance None   Other Comments/Information None     Total # of Phase 1 Group Sessions: 18 for 35 hours     Total # of Phase 2 Group Sessions: 16 for 32 hours plus 2 sessions of family for 4 hours  Total # of Phase 3 Group Sessions: 5 for 10 hours  Total # of 1:1 Sessions: 4, plus initual service for 2 hours, a 1/2 hour session 8/14     Support group attended this week: yes     Reporting sobriety: Yes       Treatment Plan Review   Treatment Plan Review completed on:  8/14/2019      Projected discharge date: 9/11/19     Client preferred learning style: Hands on     Staff member(s) contributing: Bethany Mijares, SERJIO, Fabi Giles     Received supervision: Yes (explain) - previously.     Client involvement with treatment planning: contributed to goals and plan.     Client received copy of plan/revised plan: Yes     Client agrees with plan/revised plan: Yes     Changes to Treatment Plan: No  Client's Dimension 1 Goal(s) Maintain sobriety & report use   Report any Use    Goal not addressed this week.   Client's Dimension 2 Goal(s) Goals: regular medical check up       Goal not addressed this week.   Client's Dimension 3 Goal(s) Take pHQ-9 and JOSE L-7                 Take medications as directed                  Healthy coping for anxiety       See below, presented assignment   Client's Dimension 4 Goal(s) Continue to find internal motivation for change       Goal not  "addressed   Client's Dimension 5 Goal(s) Plan for coping with and maintaining sobriety       See Below   Client's Dimension 6 Goal(s) Build sober structure and support       Goal not addressed      New Goals added since last review: None.     Goal(s) worked on since last review: Dimension  3,5     Strategies effective: Yes     Care Coordination Activities: None.     Medical, Mental Health and other appointments the client attended: Therapy scheduled for next week.     Medication issues: None.     Physical and mental health problems: \"None\"     Review and evaluation of the individual abuse prevention plan: The program's individual abuse prevention plan (IAPP) is sufficient for this client.     Substance Use Disorders:    303.90 (F10.20) Alcohol Use Disorder Severe    ASAM Risk Rating: (Note the rationale for risk rating changes)    Dimension 1 0 He reports last use as 3/12/19    Dimension 2 0 He reports \"no issues\"    Dimension 3 0 risk rating changed from 1 to 0.  he continues to report no thoughts of self harm.  He reports healthy coping for anxieity and depresssion symptoms    Dimension 4 0 no changes in risk rating    Dimension 5 1 Thanh reports cravings at 1 over this last week.      Dimension 6 1 No changes in risk rating    Data: (Need to include short narrative for the week on what was worked on)  offered feedback good insight client did actively participate    Client participated in discussion on anxiety and presented his assignment \"My Anxiety Profile\".  Was able to antonia physical sensations he experiences when feeling anxiety: including: \"my heart is racing, my mind is swirling and my voice is shakey\"  He named the anxious thoughts that went with these physical sensations: \"I am going to have a heart attack, I'm going crazy and everyone knows I am nervous\"  Was able to discuss how he copied to reduce anxiety OR avoid anxiety producing situations, including: \"I will do deep breathing\", \"I can do this\" and \"its " "ok to be nervous\"  Verbalized he likes this assignment and learned from it.    Intervention:   Behavior modification  Counselor feedback  Education  Mental health education   I met with Thanh for 35 minutes to discuss his plan and goals for the next few weeks and discuss projected discharge date. See note.   I discussed that everyone experiences some anxiety and that is can be associated with fears, phobias, nervousness and panic attacks.  I said anxiety has 3 components that interact with one another: 1) physical sensations, like hearth pounding, sweating or dizziness.  2) thoughts such as expecting something terrible to happen and 3) behavioral responses such as leaving situations or avoiding places or people.  I stressed that  he can continue to be aware of these points and anxiety, many times, before it becomes overwhelming.  I discussed the point on the handout that anxiety is related to addictive behavior in two ways: 1) we often experience anxiety when practicing new nonaddictive behaviors and 2) we temporarily reduce anxiety by engaging in addictive behavior     Assessment:    Stages of Change Model  Action    Appears/Sounds:  Cooperative  Motivated   Client seems grateful for continued awareness and tools for coping with anxiety. Is understanding how awareness is key to preventing some anxiety and to coping better with anxiety.  He seems aware of times he used alcohol to cope with anxiety and if grateful for better tools.    Plan:   -Client will discuss 2 examples of anxiety and name the physical sensations, anxious thoughts     and positive response used.    -Client will attend all weekly Phase 3 groups.    -He will discharge on 9/11    SERJIO Deng. DAMIEN, MS          Wk of:8/114 1:1, discussed discharge, anxiety profile 8/7: grief and loss, discuss tx completion 7/31 grief and loss, 7/17 spirituality, What my relapse would look like 7/10: Spirituality and self care 6/24: Warning signs and cues, " sponsor.  6/17: The hole in the sidewalk, stages of drinking

## 2019-08-14 NOTE — PROGRESS NOTES
D) Thanh met with me 1:1 and he said he has confidence in my opinion and sharing his opinion about projected discharge. He said he feels ready to be done in September and feels he has built his support well. He expressed that he is glad he has learned helpful coping for anxiety and chemical dependency. He said he feels time with AA, Sponsor and therapist will continue to be helpful.  He said therapist asked if he wanted to move to 1x every 6 weeks instead of 4, but he told her since he will be Discharging Tx he would like to keep it the way it is for now  I) I talked with him about discharge plans.  Told thanh his assignments have been truly incredible and deep.  I told Thanh I was of the opinion he is ready to discharge in September and that is with missing 8/21 and 9/4.    I encouraged ongoing communication with his wife and that he continue to work the balanced program he is, but also continue to add fun to his program  A) he verbalizes understanding of healthy coping and continues to show motivation for sobriety.  P) Discharge 9/11.  Continue AA, meeting with sponsor and therapist.  Complete Assignments for discharge  Bethany Mijares, MS, LADC, LPC

## 2019-08-28 ENCOUNTER — HOSPITAL ENCOUNTER (OUTPATIENT)
Dept: BEHAVIORAL HEALTH | Facility: CLINIC | Age: 29
End: 2019-08-28
Attending: SOCIAL WORKER
Payer: COMMERCIAL

## 2019-08-28 PROCEDURE — H2035 A/D TX PROGRAM, PER HOUR: HCPCS | Mod: HQ

## 2019-08-30 NOTE — PROGRESS NOTES
Nolan Mayo  [id number]               Adult CD Progress Note and Treatment Plan Review          Wednesday    Group Date: 8/28   Group Attendance Attended    Group Therapy Type Addiction   Group Topic Covered Anxiety. Shame. Relapse prevention.   Client's Response To Group Topic Active listening. Provided feedback to group members.   Client Group Participation Detail Highly involved   Group Attendance (Time) 2.0 Hours   Individual Attendance None   Family Attendance None   Other Comments/Information None     Total # of Phase 1 Group Sessions: 18 for 35 hours     Total # of Phase 2 Group Sessions: 16 for 32 hours plus 2 sessions of family for 4 hours  Total # of Phase 3 Group Sessions: 6 for 10 hours  Total # of 1:1 Sessions: 4, plus initual service for 2 hours, a 1/2 hour session 8/14     Support group attended this week: yes     Reporting sobriety: Yes       Treatment Plan Review   Treatment Plan Review completed on:  8/14/2019      Projected discharge date: 9/11/19     Client preferred learning style: Hands on     Staff member(s) contributing: Bethany Murdock MFA, MA, [facility]     Received supervision: Yes (explain) - previously.     Client involvement with treatment planning: contributed to goals and plan.     Client received copy of plan/revised plan: Yes     Client agrees with plan/revised plan: Yes     Changes to Treatment Plan: No  Client's Dimension 1 Goal(s) Maintain sobriety & report use   Report any Use    Goal not addressed this week.   Client's Dimension 2 Goal(s) Goals: regular medical check up       Goal not addressed this week.   Client's Dimension 3 Goal(s) Take pHQ-9 and JOSE L-7                 Take medications as directed                  Healthy coping for anxiety       See below, presented assignment   Client's Dimension 4 Goal(s) Continue to find internal motivation for change       Goal not addressed   Client's Dimension 5 Goal(s) Plan for coping with and maintaining sobriety       See Below  "  Client's Dimension 6 Goal(s) Build sober structure and support       Goal not addressed      New Goals added since last review: None.     Goal(s) worked on since last review: Dimension  3,5     Strategies effective: Yes     Care Coordination Activities: None.     Medical, Mental Health and other appointments the client attended: Therapy scheduled for next week.     Medication issues: None.     Physical and mental health problems: \"None\"     Review and evaluation of the individual abuse prevention plan: The program's individual abuse prevention plan (IAPP) is sufficient for this client.     Substance Use Disorders:    303.90 (F10.20) Alcohol Use Disorder Severe    ASAM Risk Rating: (Note the rationale for risk rating changes)    Dimension 1 0 He reports last use as 3/12/19. Client reports cravings/urges to use at 2/10, noting triggered by anxiety.    Dimension 2 0 He reports \"no issues\"    Dimension 3 1 Client rates current symptoms of anxiety at 3/10, depression at 0/10, and stress at 0/10. Client reports that he has a therapist, and reports appointment with psychiatrist scheduled in 2 weeks.    Dimension 4 0 - Client rates motivation for sobriety at 9/10.     Dimension 5 Client reports attending AA meeting in [place] when travelled. He reports work with sponsor. Client reports: \"It gets easier if you commit to a program.\"    Dimension 6 1 No changes in risk rating. Client reports supportive relationships.    Data: (Need to include short narrative for the week on what was worked on)  offered feedback good insight client did actively participate  Client participated in group assignment on shame, providing feedback to group members and sharing some personal experience with feelings. Client shared that he will be completing program in 2 weeks, and stated this treatment program has been very helpful to him. Group process discussion briefly noted some gender differences with experience of addiction and recovery needs, " but affirming that men and women equally feel and express emotions in treatment and recovery groups. Client voiced agreement.    Intervention:   Behavior modification  Counselor feedback  Education  Mental health education  Client encouraged to continue building healthy recovery lifestyle, and to identify issues / concerns to address prior to completion of program.     Assessment:    Stages of Change Model  Action    Appears/Sounds:  Cooperative  Motivated   Client presents as supportive to group members, and grateful for gains he has made in health and personal well-being. Client stated that Client will benefit from continuing clinical support and encouragement to build strong sober support network.      Plan:   -Client will discuss 2 examples of anxiety and name the physical sensations, anxious thoughts     and positive response used.    -Client will attend all weekly Phase 3 groups.    -He will discharge on 9/11    Bethany Murdock MFA, MA, [facility]      Wk of:8/28:  Client completed group assignment on shame, focus on Goals in Dim 3, 5. Client participated in external sober support, focus on Goals in Dim 4, 5, 6.  Previous: 1:1, discussed discharge, anxiety profile 8/7: grief and loss, discuss tx completion 7/31 grief and loss, 7/17 spirituality, What my relapse would look like 7/10: Spirituality and self care 6/24: Warning signs and cues, sponsor.  6/17: The hole in the sidewalk, stages of drinking

## 2019-09-11 ENCOUNTER — HOSPITAL ENCOUNTER (OUTPATIENT)
Dept: BEHAVIORAL HEALTH | Facility: CLINIC | Age: 29
End: 2019-09-11
Attending: SOCIAL WORKER
Payer: COMMERCIAL

## 2019-09-11 PROCEDURE — H2035 A/D TX PROGRAM, PER HOUR: HCPCS | Mod: HQ

## 2019-09-11 ASSESSMENT — PATIENT HEALTH QUESTIONNAIRE - PHQ9
5. POOR APPETITE OR OVEREATING: NOT AT ALL
SUM OF ALL RESPONSES TO PHQ QUESTIONS 1-9: 2

## 2019-09-11 ASSESSMENT — ANXIETY QUESTIONNAIRES
5. BEING SO RESTLESS THAT IT IS HARD TO SIT STILL: NOT AT ALL
2. NOT BEING ABLE TO STOP OR CONTROL WORRYING: NOT AT ALL
GAD7 TOTAL SCORE: 2
1. FEELING NERVOUS, ANXIOUS, OR ON EDGE: NOT AT ALL
3. WORRYING TOO MUCH ABOUT DIFFERENT THINGS: SEVERAL DAYS
7. FEELING AFRAID AS IF SOMETHING AWFUL MIGHT HAPPEN: NOT AT ALL
6. BECOMING EASILY ANNOYED OR IRRITABLE: SEVERAL DAYS
IF YOU CHECKED OFF ANY PROBLEMS ON THIS QUESTIONNAIRE, HOW DIFFICULT HAVE THESE PROBLEMS MADE IT FOR YOU TO DO YOUR WORK, TAKE CARE OF THINGS AT HOME, OR GET ALONG WITH OTHER PEOPLE: SOMEWHAT DIFFICULT

## 2019-09-11 NOTE — PROGRESS NOTES
Nolan Mayo  [id number]               Adult CD Progress Note and Treatment Plan Review        Wednesday    Group Date: 9/11   Group Attendance Attended    Group Therapy Type Addiction   Group Topic Covered  Relapse prevention. Tx completion   Client's Response To Group Topic Active listening. Provided feedback to group members.  Shows change in behavior   Client Group Participation Detail Highly involved   Group Attendance (Time) 2.0 Hours   Individual Attendance None   Family Attendance None   Other Comments/Information None     Total # of Phase 1 Group Sessions: 18 for 35 hours     Total # of Phase 2 Group Sessions: 16 for 32 hours plus 2 sessions of family for 4 hours  Total # of Phase 3 Group Sessions: 8 for16 hours  Total # of 1:1 Sessions: 4, plus initual service for 2 hours, a 1/2 hour session 8/14, 4 1:1 throughout tx     Support group attended this week: yes     Reporting sobriety: Yes       Treatment Plan Review   Treatment Plan Review completed on:  9/11      Projected discharge date: 9/11/19     Client preferred learning style: Hands on     Staff member(s) contributing: Bethany Mijares MA, [facility]     Received supervision: Yes (explain) - previously.     Client involvement with treatment planning: contributed to goals and plan.     Client received copy of plan/revised plan: Yes     Client agrees with plan/revised plan: Yes     Changes to Treatment Plan: No  Client's Dimension 1 Goal(s) Maintain sobriety & report use   Report any Use    Goal not addressed this week.   Client's Dimension 2 Goal(s) Goals: regular medical check up       Goal not addressed this week.   Client's Dimension 3 Goal(s) Take pHQ-9 and JOSE L-7                 Take medications as directed                  Healthy coping for anxiety       Client completed JOSE L and PHQ   Client's Dimension 4 Goal(s) Continue to find internal motivation for change       Goal not addressed   Client's Dimension 5 Goal(s) Plan for coping with and maintaining  "sobriety       Client presented assignment   Client's Dimension 6 Goal(s) Build sober structure and support       Goal not addressed      New Goals added since last review: None.     Goal(s) worked on since last review: Dimension  5     Strategies effective: Yes     Care Coordination Activities: None.     Medical, Mental Health and other appointments the client attended: Therapy scheduled for 2 weeks from now.     Medication issues: None.     Physical and mental health problems: \"None\"     Review and evaluation of the individual abuse prevention plan: The program's individual abuse prevention plan (IAPP) is sufficient for this client.     Substance Use Disorders:    303.90 (F10.20) Alcohol Use Disorder Severe    ASAM Risk Rating: (Note the rationale for risk rating changes)    Dimension 1 0 He reports last use as 3/12/19. Client reports cravings/urges to use at /10, noting triggered by anxiety.    Dimension 2 0 He reports \"no issues\"    Dimension 3 1 Client rates current symptoms of anxiety at 2/10, depression at 1/10, and stress at 0/10. Client reports that he has a therapist, and reports appointment with psychiatrist scheduled in 2 weeks.    Dimension 4 0 - Client rates motivation for sobriety at 9/10.     Dimension 5 Client reports attending AA meeting in [place] when travelled. He reports work with sponsor.     Dimension 6 0 He regularly saw sponsor, but sponsor recently . Thanh met with two others to decide on new sponsor.  he regulalry attends AA and therapy.. Client reports supportive relationships. Risk rating changed from 1 to 0.    Data: (Need to include short narrative for the week on what was worked on)  offered feedback good insight client did actively participate  On  Thanh Completed the Recovery Care Assignment.  He told group that he has learned many things about himself and has grown through this process.  Said he struggled with humility  and still needs to work on patience with self and " others. Thanh reports that believes in abstinence and wants to maintain it.  He said he will keep meeting with  Sober people, including group members and his Dad and AA  .and plans to continue a new sponsor, since his sponsor suddenly .   .   Will follow recovery care and aftercare plan, which includes abstinence, sober support, daily monitoring of feelings, and healthy choices for coping.  She should attend community support and/or Growth Group in [place] on  at 10:00.  Discharge from tx.    Intervention:   Behavior modification  Counselor feedback  Education  Relapse prevention  Mental health education  Client encouraged to continue building healthy recovery lifestyle, and to identify issues / concerns to address prior to completion of program.   Went over the Recovery care Assignment with client.  Encouraged ongoing support from a community group and/or [facility] in [place].  Told client h   Presented h with medclotilde for program completion.  Said strengths have included: he is very respectful and genuine.  He wants his walk and talk to match.  His attendance has been consistent.  I told client his Goodbye letter was well written and heartfelt.  I Went over aftercare plan with client, which includes daily checking in with feelings, abstinence, and sober support.  As well as Continued therapy and having fun.  For him this includes bowling on a league      Assessment:    Stages of Change Model  Action    Appears/Sounds:  Cooperative  Motivated   Client presents as supportive to group members, and grateful for gains he has made in sobriety and personal well-being.He shows maturity and commitment to sobriety in assignments. He has a thorough recovery plan and willingly recieved feedback.  Client stated that Client will benefit from continuing therapy and a new sponsor.  He seemed grateful for group and program.  He teared up and seemed sad to be leaving group, yet happy to have completed a strong  program      Plan:   _Follow Recovery Care plan, which includes daily checking in with feelings, meditation and reflection, abstinence, and sober support.  As well as Continued therapy and having fun.  For him this includes bowling on a league    -Client client will use resources and materials in folder for ongoing sobriety and coping.   -Discharge from tx    Bethany Mijares, MS, LADC, LPC      Wk of:9/4, absent 8/28:  Client completed group assignment on shame, focus on Goals in Dim 3, 5. Client participated in external sober support, focus on Goals in Dim 4, 5, 6.  Previous: 1:1, discussed discharge, anxiety profile 8/7: grief and loss, discuss tx completion 7/31 grief and loss, 7/17 spirituality, What my relapse would look like 7/10: Spirituality and self care 6/24: Warning signs and cues, sponsor.  6/17: The hole in the sidewalk, stages of drinking

## 2019-09-11 NOTE — PROGRESS NOTES
Adult Mental Health Intensive Outpatient Discharge Summary/Instructions      Patient: Nolan Mayo MRN: [id number]   : 1990 Age: 28 year old Sex: male     Admission Date: 19  Discharge Date: 19    Focus of Treatment / Progress    Personal Safety and Sobriety     * Follow your safety plan---completed at admission     * Call crisis lines as needed:    [facility] [phone] [facility] [phone]  [facility] [phone] Crisis Connection [phone]  [facility] [phone] [facility] [phone]  [facility] [phone] National Suicide Prevention [phone]  [facility] [phone] Suicide Prevention [phone]  [facility] [phone]    Managing symptoms of:  Chemical Dependency    Community support/health:  AA, therapy    Managing Symptoms and Preventing Relapse    * Go to all of your appointments    * Take all medications as directed.      * Carry a current list if medication with you    * Do not use illicit (street) drugs.  Avoid alcohol    * Report these symptoms to your care team. These are early signs of relapse:   Thoughts of suicide   Losing more sleep   Increased confusion   Mood getting worse   Feeling more aggressive   Other:  As you have reported: Increased worry, feeling distressed, feeling hopeless    *Use these skills daily: Check in with feelings, Urge surfing or similar, meditation or reflection    Copy of summary sent to: Does not apply    Follow up with psychiatrist / main caregiver: Does not apply    Follow up with your therapist: regularly and you both agree      Go to group therapy and / or support groups:  AA    See your medical doctor about:  Any concerns, at any time    Other:  Continue things like bowling and camping, that you find helpful.  Have fun.  Follow Recovery Care Plan.    Client Signature:_______________________  Date / Time:___________  Staff Signature:________________________   Date /  Time:___________

## 2019-09-11 NOTE — PROGRESS NOTES
DISCHARGE SUMMARY    NAME:  MR:    EVALUATION COUNSELOR: Woodrow Steen  TREATMENT COUNSELOR: Bethany Mijares, MS, [facility], [facility]  REFERRAL SOURCE: [facility]  PROGRAM: [facility], Outpatient Treatment  ADMISSION DATE:  2/11/19  ADMISSION DIAGNOSIS: 303/9/F10.20   Severe  DISCHARGE DIAGNOSIS: 303/9/F10.20  Severe, in early remission  DISCHARGE STATUS: 01, completed program     HOURS OF TREATMENT COMPLETED:88 hours                                 PRESENTING INFORMATION: See the diagnostic summary for complete information, Thanh was recommended to do an OP program by his PCP.He had been drinking daily.    SERVICES PROVIDED:  Services provided included diagnostic assessment,initial service session/orientation session, treatment planning, group therapy, addiction education, introduction to 12-steps, and individual sessions.   Attended group counseling during which time he was exposed to a variety of therapeutic techniques including : behavior modification, cognitive behavioral therapy, motivational enhancement therapy, relapse prevention, counselor feedback, and group feedback. Client was also exposed to a variety of topics or skills: which included, but not limited to: spiritual care, communication and relationship, meditation, stress reduction, relapse prevention, urge surfing, breath work    ISSUES ADDRESSED IN TREATMENT: (under EACH dimension there will be information on ct. Problems, needs, strength while participating in tx, tx services provided, progress toward achieving each tx plan goal identified in tx plan, conclusions and risk rating)    DIMENSION 1/ACUTE WITHDRAWAL ISSUES/DETOX: RUST Rating at entry: 0, at discharge:0     Client reports he is unable to stop using and his  Recommended an assessment.  Clients last use date was 1/20, and changes to 3/12 after a use episode.  After this he showed no signs of use or withdrawal during his tx    DIMENSION 2/BIOMEDICAL:  "Risk rating at entry:1, at discharge: 0.  He fell and sprained his wrist, outside a bowling alley, but  Followed Dr. Recommendations and reports \"all is well\"     DIMENSION 3/EMOTIONAL BEHAVIORAL:  Risk rating at entry:2, at discharge 1,  He reports a history of anxiety disorder and was taking sertraline, but no longer does.  He has worked regularly with a therapist, while in Treatment.   While in Tx we went over urge surfing technique, and client practiced and reported he uses this concept/coping skill regularly.    We discussed ways this could be helpful with anxiety or cravings. His score on the PHQ-9 at initial service was:5, not difficult at all.  At tx completion his score was 1, not difficult at all.  During initial service his JOSE L-7 was 6, somewhat difficult, and at tx completion, it was 2, not difficult at all.  His CGI scores at tx start were 3 and 3, at treatment completion, they were 1 and 1.  While in treatment yoon wanted to learn to resolve emotions without alcohol and fighting.  He did assignments on anger and understanding it, as well as on assertiveness.  He reported he regularly used the concepts of \"honest, direct and respectful\" to be assertive, rather than aggressive. His sponsor recently  and with this transition, completing tx, and some ongoing issues at home, he said he feels it is important to keep going to therapy    DIMENSION 4/READINESS TO CHANGE:  Risk rating at entry:2, at discharge:0.  He reported he was \"probalby attending tx because myu wife wants me to, but I am willing\"  He said within a few weeks he realized he did not want to do treatment for others, and he began talking to group about this and being more honest.  He gained motivation for internal change through assignments.  He began working on her consequences of use assignment, and he recognized consequences and seemed to feel remorse.  He also completed an assignment on honestly and said \"it woke me up\".  He also reported " "gaining a great deal of insight from family week participation, individual sessions, and peer insights.    DIMENSION5/RELAPSE AND CONTINUES PROBLEM POTENTIAL:  Risk rating at entry:3, at discharge 0. Client was able to name warning signs and triggers, which included her living environment, lack of patience and humility and getting angry. He named warning signs, triggers and craving level each week on his check in sheet.  He was also always able to name healthy coping mechanisms. At the completion of Treatment he did a recovery care assignment and named short, medium and long term goals.  He also was able to discuss how he would reach them.  He reported it was important to be in touch with his feelings and reflect daily.  He also said time with sponsor and going to meetings is vital.  He completed all required sessions with Marie Andersen, where topics such as Mental Health, Mindfulness and Self care were looked at and coping methods, skills were developed      DIMENSION6/RECOVERY ENVIRONMENT: Risk rating at entry: 2 at discharge 0.  Thanh reported that he had good support, but he and his wife had some conflict due to his use, as well as disagreements about other things.  He said attending the 2 night family group with his wife was \"a turning point for us both\".  While in treatment, Thanh worked on healthy communication and boundary setting.  He maintained regular sponsor contact and attended AA regularly.  He discussed the importance of this to his sobriety.      STRENGTHS:  Attendance, Excellent and thorough assignments.  Gained humility    PROGNOSIS (use the prognosis criteria listed on the tx plan cover sheet))    Favorable    LIVING ARRANGEMENTS AT DISCHARGE:  With his wife    CONTINUING CARE RECOMMENDATIONS/REFERRALS: Follow Recovery care plan and Discharge instructions, which include attending AA at least 1x per week, finding a new sponsor and meeting with him at least monthly, continuing to see therapist and " following recommendations.  Thanh should remain abstinent and not use mood altering chemicals.

## 2019-09-12 ASSESSMENT — ANXIETY QUESTIONNAIRES: GAD7 TOTAL SCORE: 2

## 2020-02-11 ENCOUNTER — HOSPITAL ENCOUNTER (INPATIENT)
Facility: CLINIC | Age: 30
LOS: 2 days | Discharge: HOME | DRG: 897 | End: 2020-02-13
Attending: EMERGENCY MEDICINE | Admitting: PSYCHIATRY & NEUROLOGY
Payer: COMMERCIAL

## 2020-02-11 ENCOUNTER — TELEPHONE (OUTPATIENT)
Dept: BEHAVIORAL HEALTH | Facility: CLINIC | Age: 30
End: 2020-02-11

## 2020-02-11 DIAGNOSIS — F10.10 ALCOHOL ABUSE: Primary | ICD-10-CM

## 2020-02-11 DIAGNOSIS — F10.930 ALCOHOL WITHDRAWAL SYNDROME WITHOUT COMPLICATION (H): ICD-10-CM

## 2020-02-11 LAB
ALBUMIN SERPL-MCNC: 4.3 G/DL (ref 3.4–5)
ALCOHOL BREATH TEST: 0 (ref 0–0.01)
ALP SERPL-CCNC: 88 U/L (ref 40–150)
ALT SERPL W P-5'-P-CCNC: 29 U/L (ref 0–70)
AMPHETAMINES UR QL SCN: NEGATIVE
ANION GAP SERPL CALCULATED.3IONS-SCNC: 7 MMOL/L (ref 3–14)
AST SERPL W P-5'-P-CCNC: 18 U/L (ref 0–45)
BARBITURATES UR QL: NEGATIVE
BASOPHILS # BLD AUTO: 0 10E9/L (ref 0–0.2)
BASOPHILS NFR BLD AUTO: 0 %
BENZODIAZ UR QL: NEGATIVE
BILIRUB SERPL-MCNC: 0.4 MG/DL (ref 0.2–1.3)
BUN SERPL-MCNC: 12 MG/DL (ref 7–30)
CALCIUM SERPL-MCNC: 9.2 MG/DL (ref 8.5–10.1)
CANNABINOIDS UR QL SCN: NEGATIVE
CHLORIDE SERPL-SCNC: 102 MMOL/L (ref 94–109)
CO2 SERPL-SCNC: 30 MMOL/L (ref 20–32)
COCAINE UR QL: NEGATIVE
CREAT SERPL-MCNC: 0.75 MG/DL (ref 0.66–1.25)
DIFFERENTIAL METHOD BLD: ABNORMAL
EOSINOPHIL # BLD AUTO: 0.1 10E9/L (ref 0–0.7)
EOSINOPHIL NFR BLD AUTO: 1.9 %
ERYTHROCYTE [DISTWIDTH] IN BLOOD BY AUTOMATED COUNT: 13 % (ref 10–15)
ETHANOL UR QL SCN: NEGATIVE
GFR SERPL CREATININE-BSD FRML MDRD: >90 ML/MIN/{1.73_M2}
GLUCOSE SERPL-MCNC: 95 MG/DL (ref 70–99)
HCT VFR BLD AUTO: 42.1 % (ref 40–53)
HGB BLD-MCNC: 14.6 G/DL (ref 13.3–17.7)
IMM GRANULOCYTES # BLD: 0 10E9/L (ref 0–0.4)
IMM GRANULOCYTES NFR BLD: 0.2 %
LYMPHOCYTES # BLD AUTO: 1.2 10E9/L (ref 0.8–5.3)
LYMPHOCYTES NFR BLD AUTO: 28.4 %
MAGNESIUM SERPL-MCNC: 1.9 MG/DL (ref 1.6–2.3)
MCH RBC QN AUTO: 30.8 PG (ref 26.5–33)
MCHC RBC AUTO-ENTMCNC: 34.7 G/DL (ref 31.5–36.5)
MCV RBC AUTO: 89 FL (ref 78–100)
MONOCYTES # BLD AUTO: 0.4 10E9/L (ref 0–1.3)
MONOCYTES NFR BLD AUTO: 9.6 %
NEUTROPHILS # BLD AUTO: 2.6 10E9/L (ref 1.6–8.3)
NEUTROPHILS NFR BLD AUTO: 59.9 %
NRBC # BLD AUTO: 0 10*3/UL
NRBC BLD AUTO-RTO: 0 /100
OPIATES UR QL SCN: NEGATIVE
PLATELET # BLD AUTO: 144 10E9/L (ref 150–450)
POTASSIUM SERPL-SCNC: 4 MMOL/L (ref 3.4–5.3)
PROT SERPL-MCNC: 7.6 G/DL (ref 6.8–8.8)
RBC # BLD AUTO: 4.74 10E12/L (ref 4.4–5.9)
SODIUM SERPL-SCNC: 139 MMOL/L (ref 133–144)
WBC # BLD AUTO: 4.3 10E9/L (ref 4–11)

## 2020-02-11 PROCEDURE — 99285 EMERGENCY DEPT VISIT HI MDM: CPT | Mod: 25

## 2020-02-11 PROCEDURE — 80307 DRUG TEST PRSMV CHEM ANLYZR: CPT | Performed by: FAMILY MEDICINE

## 2020-02-11 PROCEDURE — 25000132 ZZH RX MED GY IP 250 OP 250 PS 637: Performed by: PSYCHIATRY & NEUROLOGY

## 2020-02-11 PROCEDURE — 80320 DRUG SCREEN QUANTALCOHOLS: CPT | Performed by: FAMILY MEDICINE

## 2020-02-11 PROCEDURE — 80053 COMPREHEN METABOLIC PANEL: CPT | Performed by: EMERGENCY MEDICINE

## 2020-02-11 PROCEDURE — 82075 ASSAY OF BREATH ETHANOL: CPT

## 2020-02-11 PROCEDURE — 99285 EMERGENCY DEPT VISIT HI MDM: CPT | Mod: Z6 | Performed by: EMERGENCY MEDICINE

## 2020-02-11 PROCEDURE — 25800030 ZZH RX IP 258 OP 636: Performed by: EMERGENCY MEDICINE

## 2020-02-11 PROCEDURE — 25000132 ZZH RX MED GY IP 250 OP 250 PS 637: Performed by: EMERGENCY MEDICINE

## 2020-02-11 PROCEDURE — 96360 HYDRATION IV INFUSION INIT: CPT

## 2020-02-11 PROCEDURE — HZ2ZZZZ DETOXIFICATION SERVICES FOR SUBSTANCE ABUSE TREATMENT: ICD-10-PCS | Performed by: PSYCHIATRY & NEUROLOGY

## 2020-02-11 PROCEDURE — 85025 COMPLETE CBC W/AUTO DIFF WBC: CPT | Performed by: EMERGENCY MEDICINE

## 2020-02-11 PROCEDURE — 83735 ASSAY OF MAGNESIUM: CPT | Performed by: EMERGENCY MEDICINE

## 2020-02-11 PROCEDURE — 12800008 ZZH R&B CD ADULT

## 2020-02-11 RX ORDER — TRAZODONE HYDROCHLORIDE 50 MG/1
50 TABLET, FILM COATED ORAL
Status: DISCONTINUED | OUTPATIENT
Start: 2020-02-11 | End: 2020-02-13 | Stop reason: HOSPADM

## 2020-02-11 RX ORDER — ATENOLOL 50 MG/1
50 TABLET ORAL DAILY PRN
Status: DISCONTINUED | OUTPATIENT
Start: 2020-02-11 | End: 2020-02-13 | Stop reason: HOSPADM

## 2020-02-11 RX ORDER — OLANZAPINE 10 MG/2ML
10 INJECTION, POWDER, FOR SOLUTION INTRAMUSCULAR
Status: DISCONTINUED | OUTPATIENT
Start: 2020-02-11 | End: 2020-02-13 | Stop reason: HOSPADM

## 2020-02-11 RX ORDER — ALUMINA, MAGNESIA, AND SIMETHICONE 2400; 2400; 240 MG/30ML; MG/30ML; MG/30ML
30 SUSPENSION ORAL EVERY 4 HOURS PRN
Status: DISCONTINUED | OUTPATIENT
Start: 2020-02-11 | End: 2020-02-13 | Stop reason: HOSPADM

## 2020-02-11 RX ORDER — MULTIPLE VITAMINS W/ MINERALS TAB 9MG-400MCG
1 TAB ORAL ONCE
Status: COMPLETED | OUTPATIENT
Start: 2020-02-11 | End: 2020-02-11

## 2020-02-11 RX ORDER — MULTIPLE VITAMINS W/ MINERALS TAB 9MG-400MCG
1 TAB ORAL DAILY
Status: DISCONTINUED | OUTPATIENT
Start: 2020-02-12 | End: 2020-02-13 | Stop reason: HOSPADM

## 2020-02-11 RX ORDER — ACETAMINOPHEN 325 MG/1
650 TABLET ORAL EVERY 4 HOURS PRN
Status: DISCONTINUED | OUTPATIENT
Start: 2020-02-11 | End: 2020-02-13 | Stop reason: HOSPADM

## 2020-02-11 RX ORDER — OLANZAPINE 10 MG/1
10 TABLET ORAL
Status: DISCONTINUED | OUTPATIENT
Start: 2020-02-11 | End: 2020-02-13 | Stop reason: HOSPADM

## 2020-02-11 RX ORDER — FOLIC ACID 1 MG/1
1 TABLET ORAL DAILY
Status: DISCONTINUED | OUTPATIENT
Start: 2020-02-12 | End: 2020-02-13 | Stop reason: HOSPADM

## 2020-02-11 RX ORDER — DIAZEPAM 5 MG
5-20 TABLET ORAL EVERY 30 MIN PRN
Status: DISCONTINUED | OUTPATIENT
Start: 2020-02-11 | End: 2020-02-13 | Stop reason: HOSPADM

## 2020-02-11 RX ORDER — BISACODYL 10 MG
10 SUPPOSITORY, RECTAL RECTAL DAILY PRN
Status: DISCONTINUED | OUTPATIENT
Start: 2020-02-11 | End: 2020-02-13 | Stop reason: HOSPADM

## 2020-02-11 RX ORDER — HYDROXYZINE HYDROCHLORIDE 25 MG/1
25-50 TABLET, FILM COATED ORAL EVERY 4 HOURS PRN
Status: DISCONTINUED | OUTPATIENT
Start: 2020-02-11 | End: 2020-02-13 | Stop reason: HOSPADM

## 2020-02-11 RX ORDER — IBUPROFEN 600 MG/1
600 TABLET, FILM COATED ORAL EVERY 6 HOURS PRN
Status: DISCONTINUED | OUTPATIENT
Start: 2020-02-11 | End: 2020-02-13 | Stop reason: HOSPADM

## 2020-02-11 RX ORDER — DIAZEPAM 10 MG
10 TABLET ORAL ONCE
Status: COMPLETED | OUTPATIENT
Start: 2020-02-11 | End: 2020-02-11

## 2020-02-11 RX ORDER — ONDANSETRON 4 MG/1
4 TABLET, ORALLY DISINTEGRATING ORAL EVERY 6 HOURS PRN
Status: DISCONTINUED | OUTPATIENT
Start: 2020-02-11 | End: 2020-02-13 | Stop reason: HOSPADM

## 2020-02-11 RX ORDER — LANOLIN ALCOHOL/MO/W.PET/CERES
100 CREAM (GRAM) TOPICAL ONCE
Status: COMPLETED | OUTPATIENT
Start: 2020-02-11 | End: 2020-02-11

## 2020-02-11 RX ORDER — FOLIC ACID 1 MG/1
1 TABLET ORAL ONCE
Status: COMPLETED | OUTPATIENT
Start: 2020-02-11 | End: 2020-02-11

## 2020-02-11 RX ORDER — LANOLIN ALCOHOL/MO/W.PET/CERES
100 CREAM (GRAM) TOPICAL DAILY
Status: DISCONTINUED | OUTPATIENT
Start: 2020-02-12 | End: 2020-02-13 | Stop reason: HOSPADM

## 2020-02-11 RX ORDER — DIAZEPAM 5 MG
5-20 TABLET ORAL EVERY 30 MIN PRN
Status: DISCONTINUED | OUTPATIENT
Start: 2020-02-11 | End: 2020-02-11

## 2020-02-11 RX ORDER — LOPERAMIDE HCL 2 MG
2 CAPSULE ORAL 4 TIMES DAILY PRN
Status: DISCONTINUED | OUTPATIENT
Start: 2020-02-11 | End: 2020-02-13 | Stop reason: HOSPADM

## 2020-02-11 RX ADMIN — SODIUM CHLORIDE 1000 ML: 9 INJECTION, SOLUTION INTRAVENOUS at 18:41

## 2020-02-11 RX ADMIN — NICOTINE 1 PATCH: 7 PATCH TRANSDERMAL at 22:24

## 2020-02-11 RX ADMIN — FOLIC ACID 1 MG: 1 TABLET ORAL at 19:59

## 2020-02-11 RX ADMIN — THIAMINE HCL TAB 100 MG 100 MG: 100 TAB at 19:59

## 2020-02-11 RX ADMIN — DIAZEPAM 10 MG: 10 TABLET ORAL at 18:45

## 2020-02-11 RX ADMIN — MULTIPLE VITAMINS W/ MINERALS TAB 1 TABLET: TAB at 19:59

## 2020-02-11 ASSESSMENT — ENCOUNTER SYMPTOMS
NERVOUS/ANXIOUS: 1
FEVER: 0
CONFUSION: 0
NECK STIFFNESS: 0
EYE REDNESS: 0
SHORTNESS OF BREATH: 0
ARTHRALGIAS: 0
DIFFICULTY URINATING: 0
COLOR CHANGE: 0
HEADACHES: 0
ABDOMINAL PAIN: 0

## 2020-02-11 ASSESSMENT — ACTIVITIES OF DAILY LIVING (ADL)
RETIRED_EATING: 0-->INDEPENDENT
DRESS: 0-->INDEPENDENT
BATHING: 0-->INDEPENDENT
SWALLOWING: 0-->SWALLOWS FOODS/LIQUIDS WITHOUT DIFFICULTY
RETIRED_COMMUNICATION: 0-->UNDERSTANDS/COMMUNICATES WITHOUT DIFFICULTY
TOILETING: 0-->INDEPENDENT

## 2020-02-11 ASSESSMENT — MIFFLIN-ST. JEOR: SCORE: 2110.24

## 2020-02-12 PROCEDURE — 25000132 ZZH RX MED GY IP 250 OP 250 PS 637: Performed by: PSYCHIATRY & NEUROLOGY

## 2020-02-12 PROCEDURE — 12800008 ZZH R&B CD ADULT

## 2020-02-12 PROCEDURE — 99231 SBSQ HOSP IP/OBS SF/LOW 25: CPT | Performed by: PHYSICIAN ASSISTANT

## 2020-02-12 PROCEDURE — 99207 ZZC CDG-MDM COMPONENT: MEETS LOW - DOWN CODED: CPT | Performed by: PSYCHIATRY & NEUROLOGY

## 2020-02-12 PROCEDURE — 99222 1ST HOSP IP/OBS MODERATE 55: CPT | Mod: AI | Performed by: PSYCHIATRY & NEUROLOGY

## 2020-02-12 PROCEDURE — 99207 ZZC CDG-HISTORY COMP: MEETS EXP. PROBLEM FOCUSED - DOWN CODED LACK OF HPI: CPT | Performed by: PHYSICIAN ASSISTANT

## 2020-02-12 RX ORDER — CLONIDINE HYDROCHLORIDE 0.1 MG/1
0.1 TABLET ORAL 3 TIMES DAILY PRN
Status: DISCONTINUED | OUTPATIENT
Start: 2020-02-12 | End: 2020-02-13 | Stop reason: HOSPADM

## 2020-02-12 RX ORDER — BUSPIRONE HYDROCHLORIDE 5 MG/1
5 TABLET ORAL 3 TIMES DAILY
Status: DISCONTINUED | OUTPATIENT
Start: 2020-02-12 | End: 2020-02-13 | Stop reason: HOSPADM

## 2020-02-12 RX ADMIN — THIAMINE HCL TAB 100 MG 100 MG: 100 TAB at 08:39

## 2020-02-12 RX ADMIN — NICOTINE 1 PATCH: 7 PATCH TRANSDERMAL at 20:22

## 2020-02-12 RX ADMIN — BUSPIRONE HYDROCHLORIDE 5 MG: 5 TABLET ORAL at 12:00

## 2020-02-12 RX ADMIN — BUSPIRONE HYDROCHLORIDE 5 MG: 5 TABLET ORAL at 17:06

## 2020-02-12 RX ADMIN — Medication 25 MG: at 11:59

## 2020-02-12 RX ADMIN — FOLIC ACID 1 MG: 1 TABLET ORAL at 08:40

## 2020-02-12 RX ADMIN — BUSPIRONE HYDROCHLORIDE 5 MG: 5 TABLET ORAL at 20:25

## 2020-02-12 RX ADMIN — MULTIPLE VITAMINS W/ MINERALS TAB 1 TABLET: TAB at 08:40

## 2020-02-12 ASSESSMENT — ACTIVITIES OF DAILY LIVING (ADL)
DRESS: STREET CLOTHES
LAUNDRY: WITH SUPERVISION
HYGIENE/GROOMING: INDEPENDENT
ORAL_HYGIENE: INDEPENDENT

## 2020-02-12 NOTE — ED NOTES
"ED to Behavioral Floor Handoff    SITUATION  Nolan Mayo is a 29 year old male who speaks English and lives in a home with a spouse The patient arrived in the ED by private car from home with a complaint of Alcohol Problem ( 1/2-1 liter per day of whisky; last drink today around 0900; denies withdraw seizures; reports usually has tremors, agitation, myalgias, temperature fluctuations, and agitation )  .The patient's current symptoms started/worsened 3 week(s) ago and during this time the symptoms have increased.   In the ED, pt was diagnosed with   Final diagnoses:   Alcohol withdrawal syndrome without complication (H)        Initial vitals were: BP: (!) 153/93  Pulse: 81  Temp: 98.3  F (36.8  C)  Resp: 16  Height: 193 cm (6' 4\")  Weight: 103.9 kg (229 lb)  SpO2: 100 %   --------  Is the patient diabetic? No   If yes, last blood glucose? --     If yes, was this treated in the ED? --  --------  Is the patient inebriated (ETOH) No or Impaired on other substances? No  MSSA done? Yes  Last MSSA score: 3    Were withdrawal symptoms treated? Yes, was given 10 mg valium per MD  Does the patient have a seizure history? No. If yes, date of most recent seizure--  --------  Is the patient patient experiencing suicidal ideation? denies current or recent suicidal ideation     Homicidal ideation? denies current or recent homicidal ideation or behaviors.    Self-injurious behavior/urges? denies current or recent self injurious behavior or ideation.  ------  Was pt aggressive in the ED No  Was a code called No  Is the pt now cooperative? Yes  -------  Meds given in ED:   Medications   diazepam (VALIUM) tablet 5-20 mg (has no administration in time range)   0.9% sodium chloride BOLUS (0 mLs Intravenous Stopped 2/11/20 2009)   diazepam (VALIUM) tablet 10 mg (10 mg Oral Given 2/11/20 1845)   vitamin B1 (THIAMINE) tablet 100 mg (100 mg Oral Given 2/11/20 1959)   folic acid (FOLVITE) tablet 1 mg (1 mg Oral Given 2/11/20 1959) "   multivitamin w/minerals (THERA-VIT-M) tablet 1 tablet (1 tablet Oral Given 2/11/20 1959)      Family present during ED course? No  Family currently present? No    BACKGROUND  Does the patient have a cognitive impairment or developmental disability? No  Allergies:   Allergies   Allergen Reactions     Erythromycin Nausea and Vomiting   .   Social demographics are   Social History     Socioeconomic History     Marital status:      Spouse name: None     Number of children: None     Years of education: None     Highest education level: None   Occupational History     None   Social Needs     Financial resource strain: None     Food insecurity:     Worry: None     Inability: None     Transportation needs:     Medical: None     Non-medical: None   Tobacco Use     Smoking status: Current Every Day Smoker     Types: Cigarettes     Smokeless tobacco: Never Used     Tobacco comment: on nicotine patch   Substance and Sexual Activity     Alcohol use: Yes     Comment: 1/2- 1 liter of whisky per day     Drug use: Never     Sexual activity: None   Lifestyle     Physical activity:     Days per week: None     Minutes per session: None     Stress: None   Relationships     Social connections:     Talks on phone: None     Gets together: None     Attends Confucianism service: None     Active member of club or organization: None     Attends meetings of clubs or organizations: None     Relationship status: None     Intimate partner violence:     Fear of current or ex partner: None     Emotionally abused: None     Physically abused: None     Forced sexual activity: None   Other Topics Concern     Parent/sibling w/ CABG, MI or angioplasty before 65F 55M? Not Asked   Social History Narrative     None        ASSESSMENT  Labs results   Labs Ordered and Resulted from Time of ED Arrival Up to the Time of Departure from the ED   CBC WITH PLATELETS DIFFERENTIAL - Abnormal; Notable for the following components:       Result Value    Platelet  "Count 144 (*)     All other components within normal limits   ALCOHOL BREATH TEST POCT - Normal   DRUG ABUSE SCREEN 6 CHEM DEP URINE ([facility])   COMPREHENSIVE METABOLIC PANEL   MAGNESIUM   PERIPHERAL IV CATHETER   MSSA SCORE AND VS   NOTIFY      Imaging Studies: No results found for this or any previous visit (from the past 24 hour(s)).   Most recent vital signs BP (!) 153/93   Pulse 81   Temp 98.3  F (36.8  C) (Oral)   Resp 16   Ht 1.93 m (6' 4\")   Wt 103.9 kg (229 lb)   SpO2 100%   BMI 27.87 kg/m     Abnormal labs/tests/findings requiring intervention:---   Pain control: pt had none  Nausea control: pt had none    RECOMMENDATION  Are any infection precautions needed (MRSA, VRE, etc.)? No If yes, what infection? --  ---  Does the patient have mobility issues? independently. If yes, what device does the pt use? ---  ---  Is patient on 72 hour hold or commitment? No If on 72 hour hold, have hold and rights been given to patient? N/A  Are admitting orders written if after 10 p.m. ?N/A  Tasks needing to be completed:---     Lilly Pineda RN   asc--    [phone]   [phone] [facility]    "

## 2020-02-12 NOTE — CONSULTS
[facility], [place]  Consult Note - Hospitalist Service     Date of Admission:  2/11/2020  Consult Requested by: Dr. Sharp  Reason for Consult: Medical co-management    Assessment & Plan   Nolan Mayo is a 29 year old male with a history of alcohol abuse, anxiety, who was admitted to station 3A seeking detoxification from alcohol.     Alcohol dependence: Drinking 1/2 to 1 liter per day. Last drink yesterday morning. Utox in the ED negative.  No history of withdrawal seizures and DTs. MSSA currently 4.     - Continue on MSSA protocol with benzodiazepines as indicated   - Management per Psychiatry   - Agree with MVI, folic acid, and thiamine supplements   - Notify medicine for SBP >180 or DBP >110    Anxiety: PTA on Zoloft. Management per Psychiatry.     Elevated blood pressure: SBP up to max of 155, no prior diagnosis of hypertension. BP is most likely acutely elevated in setting of alcohol withdrawal.    - Continue to monitor   - Notify Medicine if SBP is consistently >140/90 once he has completed withdrawal or for acute severe elevations as above    Mild thrombocytopenia: Platelets 144, most likely due to heavy alcohol use. No s/s of active bleeding.   - He should have repeat CBC within 2 weeks of discharge    Medicine will sign off at this time. Please do not hesitate to contact if new questions or concerns arise.     Aria Liz PA-C  [facility], [place]  Hospitalist Service  Pager: [phone]      ______________________________________________________________________    Chief Complaint   Alcohol withdrawal     History is obtained from the patient    History of Present Illness   Nolan Mayo is a 29 year old male with a history of alcohol abuse, anxiety, who was admitted to station 3A seeking detoxification from alcohol. He has been drinking about 1/2 to 1 liter of alcohol daily. Last drink yesterday morning. He has been very tremulous with  previous withdrawals but denies a history of seizures or DTs. No other substance use.     Currently he is feeling tired, does not feel that his withdrawal symptoms are very severe currently. Is eating and drinking. Has a mild dry cough which is improving, is recovering from a recent cold. No fevers/chills, dyspnea, chest pain, palpitations, abdominal pain, nausea/vomiting. Over the last several weeks has been bruising more easily, no bleeding. He is otherwise physically healthy with no history of cardiovascular or pulmonary disease. Is not diabetic.     Review of Systems   The 10 point Review of Systems is negative other than noted in the HPI or here.     Past Medical History    I have reviewed this patient's medical history and updated it with pertinent information if needed.   Past Medical History:   Diagnosis Date     Alcohol abuse      Anxiety      Childhood asthma        Past Surgical History   I have reviewed this patient's surgical history and updated it with pertinent information if needed.  History reviewed. No pertinent surgical history.    Social History   I have reviewed this patient's social history and updated it with pertinent information if needed.  Social History     Tobacco Use     Smoking status: Current Every Day Smoker     Types: Cigarettes     Smokeless tobacco: Never Used     Tobacco comment: on nicotine patch   Substance Use Topics     Alcohol use: Yes     Comment: 1/2- 1 liter of whisky per day     Drug use: Never       Family History   I have reviewed this patient's family history and updated it with pertinent information if needed.   Family History   Problem Relation Age of Onset     Substance Abuse Father      Substance Abuse Maternal Grandfather      Substance Abuse Paternal Grandfather        Medications   Current Facility-Administered Medications   Medication     acetaminophen (TYLENOL) tablet 650 mg     alum & mag hydroxide-simethicone (MYLANTA ES/MAALOX  ES) suspension 30 mL      atenolol (TENORMIN) tablet 50 mg     bisacodyl (DULCOLAX) Suppository 10 mg     diazepam (VALIUM) tablet 5-20 mg     folic acid (FOLVITE) tablet 1 mg     hydrOXYzine (ATARAX) tablet 25-50 mg     ibuprofen (ADVIL/MOTRIN) tablet 600 mg     loperamide (IMODIUM) capsule 2 mg     magnesium hydroxide (MILK OF MAGNESIA) suspension 30 mL     multivitamin w/minerals (THERA-VIT-M) tablet 1 tablet     nicotine (NICODERM CQ) 7 MG/24HR 24 hr patch 1 patch     nicotine Patch in Place     OLANZapine (zyPREXA) tablet 10 mg    Or     OLANZapine (zyPREXA) injection 10 mg     ondansetron (ZOFRAN-ODT) ODT tab 4 mg     traZODone (DESYREL) tablet 50 mg     vitamin B1 (THIAMINE) tablet 100 mg       Allergies   Allergies   Allergen Reactions     Erythromycin Nausea and Vomiting       Physical Exam   Vital Signs: Temp: 97.9  F (36.6  C) Temp src: Oral BP: (!) 155/92 Pulse: 85   Resp: 16 SpO2: 100 % O2 Device: None (Room air)    Weight: 229 lbs 0 oz    Constitutional: Awake and alert, in no apparent distress. Sitting up comfortably in chair.   Eyes: Sclera clear, anicteric   Respiratory: Breathing comfortably on room air. Clear to auscultation bilaterally with no crackles, wheezing, or rhonchi. Good air entry throughout.   Cardiovascular:  RRR, normal S1/S2. No rubs or murmurs. No peripheral edema.   GI: Soft, non-tender, non-distended. Bowel sounds present.   Skin: Warm and dry. Good color. No jaundice.   Neurologic: Alert and fully oriented. No focal deficits.     Data   ROUTINE IP LABS (Last four results)  Recent Labs   Lab 02/11/20  1840      POTASSIUM 4.0   CHLORIDE 102   CO2 30   ANIONGAP 7   GLC 95   BUN 12   CR 0.75   BRANDON 9.2   MAG 1.9   PROTTOTAL 7.6   ALBUMIN 4.3   BILITOTAL 0.4   ALKPHOS 88   AST 18   ALT 29     Recent Labs   Lab 02/11/20  1840   WBC 4.3   RBC 4.74   HGB 14.6   HCT 42.1   MCV 89   MCH 30.8   MCHC 34.7   RDW 13.0   *     No lab results found in last 7 days.

## 2020-02-12 NOTE — ED NOTES
Explanation regarding detox unit rules provided to patient including the following:    No cell phone use Yes    Locked unit Yes    Nicotine patches or gum Yes    Patient needs to be medically cleared prior to admission Yes

## 2020-02-12 NOTE — PLAN OF CARE
"ADMIT:Received call and clinical from Dr. Cardoza on pt currently at [place] ED and in need of IP Detox     B: 30 y/o male who is drinking 1-1.5 L Whiskey daily for the past few weeks with FLORES at approximately 0900 today arrived on 3a detox unit at 2145 tonight. No hx of Sz's or DT's. SARAN upon arrival was 0. Pt received valium around 1800 in the ED prior to arriving on the unit. Pt denies use of any other substances. Pt is currently endorsing withdrawal sx's of nausea, tremors, body aches. Medical Hx: childhood asthma MH Hx: depression but denies all SI. Vitals:  /93  P 81  RR 16  O2  100% RA.  (MSSA at 2145 was 5). UTox is negative. CMP is WNL, Magnesium is WNL. CBC is WNL other than Platelet Count 144. Received fluids, MVI, Folic Acid, Vitamin B, and Valium 10mg in ED. Pt is reported as medically cleared.   Pt has been appropriate on the unit upon arrival.  Pt admits to many stressors as to why he drinks, ie: \"marriage, money, I just can't stop drinking\"!   Pt has admitted to being a \"full fledged alcoholic\" and only has \"brief moments of sobriety\" and then \"fails\".  Will continue to assess WD.    "

## 2020-02-12 NOTE — PROGRESS NOTES
02/11/20 2138   Patient Belongings   Did you bring any home meds/supplements to the hospital?  No   Patient Belongings locker;returned to patient at discharge   Patient Belongings Remaining with Patient other (see comments)   Patient Belongings Put in Hospital Secure Location (Security or Locker, etc.) other (see comments)   Belongings Search Yes   Clothing Search Yes   Second Staff Shadia POSEY    Comment see  my note   Storage Bin   \hat, coat and grey duffel bag  Medical Bin   2x cell phone, wallet, keys,  NO   Security Envelope #[id number]  $5, [employer] rewards credit card, [place] visa card, [place] 's license, cash wise card, trustone visa card, wells denise card, 2x [employer] health cards, [employer] card   A             Admission:  I am responsible for any personal items that are not sent to the safe or pharmacy.  [facility] is not responsible for loss, theft or damage of any property in my possession.  Signature:  _________________________________ Date: _______  Time: _____                                              Staff Signature:  ____________________________ Date: ________  Time: _____      2nd Staff person, if patient is unable/unwilling to sign:    Signature: ________________________________ Date: ________  Time: _____   Discharge:  [facility] has returned all of my personal belongings:  Signature: _________________________________ Date: ________  Time: _____                                          Staff Signature:  ____________________________ Date: ________  Time: _____

## 2020-02-12 NOTE — PROGRESS NOTES
"Asked pt what his plans are for after detox is complete he states \"I would like to get with prescription service and get back with my sponsor\" asked what he meant by prescription service he states \"either antabuse or naltrexone\". States his wife can help him administer the antabuse as a way to help foster and regain trust. Pt denies any suicidal ideation plans or intent. Endorses anxiety 5, depression 3 of 10 in severity. Also reports feeling some hopefulness. MSSA score today is a 4, he does not appear to be in any symptomatic withdrawal at this point in his stay.  "

## 2020-02-12 NOTE — PROGRESS NOTES
"Met with Pt to initiate discharge planning.  Pt states he has a sponsor and attends AA twice weekly.  Pt plans to increase his attendance at AA, perhaps to 90/90.  Pt is also seeking medication assisted treatment in the form of antabuse or \"something where I can just go to my wife, take it, and know I'm good.\"  Pt is declining referral for treatment at this time.  Advised Pt to seek assistance if needs change.  Pt acknowledged.  "

## 2020-02-12 NOTE — TELEPHONE ENCOUNTER
S: Received call and clinical from Dr. Cardoza on pt currently at [place] ED and in need of IP Detox    B: 30 y/o male who is drinking 1-1.5 L Whiskey daily with FLORES at approximately 0900 today. No hx of Sz's or DT's. BAC upon arrival was 0. Pt denies use of any other substances. Pt is currently endorsing withdrawal sx's of nausea, tremors, body aches. Medical Hx: childhood asthma MH Hx: depression but denies all SI  Vitals:  /93  P 81  RR 16  O2  100% RA  MSSA 4. UTox is negative. CMP is WNL, Magnesium is WNL. CBC is WNL other than Platelet Count 144. Received fluids, MVI, Folic Acid, Vitamin B, and Valium 10mg in ED. Pt is reported as medically cleared.     Patient cleared and ready for behavioral bed placement: Yes      A: Voluntary    R: Paged on-call to review for admission     2031: Reviewed with on-call provider  -accepts for 3A/[facility]    2039: Spoke with Tawana Powell RN on 3A  -provided notification of pending admit  -N2N as soon as ED is able     2041: Spoke with SCOTTY Rivera in [place] ED  -provided dispo and time for N2N           Pt in que for 3A/[facility]

## 2020-02-12 NOTE — ED PROVIDER NOTES
History     Chief Complaint   Patient presents with     Alcohol Problem      1/2-1 liter per day of whisky; last drink today around 0900; denies withdraw seizures; reports usually has tremors, agitation, myalgias, temperature fluctuations, and agitation      HPI  Nolan Mayo is a 29 year old male who presents to the emergency department seeking detox from alcohol.  He states that he has been drinking alcohol daily since January 1.  He states that he drinks 1/2 to 1 L of whiskey per day.  He last drank at 9:00 this morning.  He reports a history of withdrawal that includes tremors, agitation, myalgias, nausea, and chills.  Patient denies any vomiting.  Reports some mild withdrawal symptoms now and that he feels anxious and agitated.  He denies a history of alcohol withdrawal seizures or DTs.  Patient reports some depression but denies suicidal ideations.  Has been prescribed Zoloft in the past.  Patient states he has had an occasional cough.  He states it was productive 3 days ago but not since.  He denies any fever.  He denies any chest pain or dyspnea.  He denies any significant cough today.  Patient denies any leg pain or swelling.  Denies any recent travel or prolonged immobilization.    I have reviewed the Medications, Allergies, Past Medical and Surgical History, and Social History in the Epic system.    Review of Systems   Constitutional: Negative for fever.   HENT: Negative for congestion.    Eyes: Negative for redness.   Respiratory: Negative for shortness of breath.    Cardiovascular: Negative for chest pain.   Gastrointestinal: Negative for abdominal pain.   Genitourinary: Negative for difficulty urinating.   Musculoskeletal: Negative for arthralgias and neck stiffness.   Skin: Negative for color change.   Neurological: Negative for headaches.   Psychiatric/Behavioral: Negative for confusion. The patient is nervous/anxious.    All other systems reviewed and are negative.      Physical Exam   BP: (!)  "153/93  Pulse: 81  Temp: 98.3  F (36.8  C)  Resp: 16  Height: 193 cm (6' 4\")  Weight: 103.9 kg (229 lb)  SpO2: 100 %      Physical Exam  Vitals signs and nursing note reviewed.   Constitutional:       General: He is not in acute distress.     Appearance: He is not diaphoretic.   HENT:      Head: Normocephalic and atraumatic.      Right Ear: External ear normal.      Left Ear: External ear normal.      Nose: Nose normal.      Mouth/Throat:      Mouth: Mucous membranes are moist.      Comments: Tongue fasciculations  Eyes:      General: No scleral icterus.     Pupils: Pupils are equal, round, and reactive to light.   Neck:      Musculoskeletal: Normal range of motion.   Cardiovascular:      Rate and Rhythm: Normal rate and regular rhythm.      Pulses: Normal pulses.      Heart sounds: Normal heart sounds.   Pulmonary:      Effort: Pulmonary effort is normal. No respiratory distress.      Breath sounds: Normal breath sounds.   Abdominal:      General: Bowel sounds are normal.      Palpations: Abdomen is soft.      Tenderness: There is no abdominal tenderness.   Musculoskeletal: Normal range of motion.         General: No tenderness.   Skin:     General: Skin is warm.      Capillary Refill: Capillary refill takes less than 2 seconds.      Findings: No rash.   Neurological:      General: No focal deficit present.      Mental Status: He is alert.      Cranial Nerves: No cranial nerve deficit.      Motor: Tremor present. No weakness.      Coordination: Coordination normal.      Gait: Gait normal.   Psychiatric:         Mood and Affect: Mood normal.         Behavior: Behavior normal.         ED Course        Procedures        Results for orders placed or performed during the hospital encounter of 02/11/20   Drug abuse screen 6 urine (chem dep)     Status: None   Result Value Ref Range    Amphetamine Qual Urine Negative NEG^Negative    Barbiturates Qual Urine Negative NEG^Negative    Benzodiazepine Qual Urine Negative " NEG^Negative    Cannabinoids Qual Urine Negative NEG^Negative    Cocaine Qual Urine Negative NEG^Negative    Ethanol Qual Urine Negative NEG^Negative    Opiates Qualitative Urine Negative NEG^Negative   CBC with platelets differential     Status: Abnormal   Result Value Ref Range    WBC 4.3 4.0 - 11.0 10e9/L    RBC Count 4.74 4.4 - 5.9 10e12/L    Hemoglobin 14.6 13.3 - 17.7 g/dL    Hematocrit 42.1 40.0 - 53.0 %    MCV 89 78 - 100 fl    MCH 30.8 26.5 - 33.0 pg    MCHC 34.7 31.5 - 36.5 g/dL    RDW 13.0 10.0 - 15.0 %    Platelet Count 144 (L) 150 - 450 10e9/L    Diff Method Automated Method     % Neutrophils 59.9 %    % Lymphocytes 28.4 %    % Monocytes 9.6 %    % Eosinophils 1.9 %    % Basophils 0.0 %    % Immature Granulocytes 0.2 %    Nucleated RBCs 0 0 /100    Absolute Neutrophil 2.6 1.6 - 8.3 10e9/L    Absolute Lymphocytes 1.2 0.8 - 5.3 10e9/L    Absolute Monocytes 0.4 0.0 - 1.3 10e9/L    Absolute Eosinophils 0.1 0.0 - 0.7 10e9/L    Absolute Basophils 0.0 0.0 - 0.2 10e9/L    Abs Immature Granulocytes 0.0 0 - 0.4 10e9/L    Absolute Nucleated RBC 0.0    Comprehensive metabolic panel     Status: None   Result Value Ref Range    Sodium 139 133 - 144 mmol/L    Potassium 4.0 3.4 - 5.3 mmol/L    Chloride 102 94 - 109 mmol/L    Carbon Dioxide 30 20 - 32 mmol/L    Anion Gap 7 3 - 14 mmol/L    Glucose 95 70 - 99 mg/dL    Urea Nitrogen 12 7 - 30 mg/dL    Creatinine 0.75 0.66 - 1.25 mg/dL    GFR Estimate >90 >60 mL/min/[1.73_m2]    GFR Estimate If Black >90 >60 mL/min/[1.73_m2]    Calcium 9.2 8.5 - 10.1 mg/dL    Bilirubin Total 0.4 0.2 - 1.3 mg/dL    Albumin 4.3 3.4 - 5.0 g/dL    Protein Total 7.6 6.8 - 8.8 g/dL    Alkaline Phosphatase 88 40 - 150 U/L    ALT 29 0 - 70 U/L    AST 18 0 - 45 U/L   Magnesium     Status: None   Result Value Ref Range    Magnesium 1.9 1.6 - 2.3 mg/dL   Alcohol breath test POCT     Status: Normal   Result Value Ref Range    Alcohol Breath Test 0.00 0.00 - 0.01      Medications   nicotine (NICODERM  CQ) 7 MG/24HR 24 hr patch 1 patch (1 patch Transdermal Patch/Med Applied 2/11/20 2224)   hydrOXYzine (ATARAX) tablet 25-50 mg (has no administration in time range)   diazepam (VALIUM) tablet 5-20 mg (has no administration in time range)   atenolol (TENORMIN) tablet 50 mg (has no administration in time range)   vitamin B1 (THIAMINE) tablet 100 mg (has no administration in time range)   folic acid (FOLVITE) tablet 1 mg (has no administration in time range)   multivitamin w/minerals (THERA-VIT-M) tablet 1 tablet (has no administration in time range)   acetaminophen (TYLENOL) tablet 650 mg (has no administration in time range)   alum & mag hydroxide-simethicone (MYLANTA ES/MAALOX  ES) suspension 30 mL (has no administration in time range)   magnesium hydroxide (MILK OF MAGNESIA) suspension 30 mL (has no administration in time range)   bisacodyl (DULCOLAX) Suppository 10 mg (has no administration in time range)   traZODone (DESYREL) tablet 50 mg (has no administration in time range)   OLANZapine (zyPREXA) tablet 10 mg (has no administration in time range)     Or   OLANZapine (zyPREXA) injection 10 mg (has no administration in time range)   nicotine Patch in Place ( Transdermal Patch in Place 2/11/20 2225)   ibuprofen (ADVIL/MOTRIN) tablet 600 mg (has no administration in time range)   loperamide (IMODIUM) capsule 2 mg (has no administration in time range)   ondansetron (ZOFRAN-ODT) ODT tab 4 mg (has no administration in time range)   0.9% sodium chloride BOLUS (0 mLs Intravenous Stopped 2/11/20 2009)   diazepam (VALIUM) tablet 10 mg (10 mg Oral Given 2/11/20 1845)   vitamin B1 (THIAMINE) tablet 100 mg (100 mg Oral Given 2/11/20 1959)   folic acid (FOLVITE) tablet 1 mg (1 mg Oral Given 2/11/20 1959)   multivitamin w/minerals (THERA-VIT-M) tablet 1 tablet (1 tablet Oral Given 2/11/20 1959)             Assessments & Plan (with Medical Decision Making)   29 year old male with history of alcohol abuse and dependence to the  emergency department seeking detox from alcohol.  He last drank this morning and is in active withdrawal when he arrives to the emergency department with tremors and tongue fasciculations as well as some hypertension.  Patient was treated with IV fluids as well as oral Valium with control of his symptoms.  The patient's labs are unrevealing.  Patient does not have any medical concerns.  He is not suicidal but does endorse some symptoms of depression.  The patient appears medically stable for detox admission.    I have reviewed the nursing notes.    I have reviewed the findings, diagnosis, plan and need for follow up with the patient.    Current Discharge Medication List          Final diagnoses:   Alcohol withdrawal syndrome without complication (H)       2/11/2020   [facility], [place], EMERGENCY DEPARTMENT     Hitesh Cardoza MD  02/12/20 0039

## 2020-02-12 NOTE — PLAN OF CARE
Behavioral Team Discussion: (2/12/2020)    Continued Stay Criteria/Rationale: Patient admitted for alcohol withdrawal and Use Disorder.  Plan: The following services will be provided to the patient; psychiatric assessment, medication management, therapeutic milieu, individual and group support, and skills groups.   Participants: 3A Provider: Dr. Amanda Doshi MD; 3A RN's: Dereck Borden, RN; 3A CM's: Stella Dupree .  Summary/Recommendation: Providers will assess today for treatment recommendations, discharge planning, and aftercare plans. CM will meet with pt for discharge planning.   Medical/Physical: childhood asthma  Precautions:   Behavioral Orders   Procedures     Code 1 - Restrict to Unit     Routine Programming     As clinically indicated     Status 15     Every 15 minutes.     Withdrawal precautions     Rationale for change in precautions or plan: N/A  Progress: No Change.

## 2020-02-12 NOTE — PHARMACY-ADMISSION MEDICATION HISTORY
"Admission Medication History status for the 2/11/2020 admission is complete.  See EPIC admission navigator for Prior to Admission medications.    Medication history sources:  Patient     Medication history source reliability: Good    Medication adherence:  Good    Changes made to PTA medication list (reason)  Added:     -nicotine patch step 3 (7 mg/24 hr)   -added per patient. Last placed yesterday around 1900. Patient sleeps with patch on.    Deleted:     -sertraline 25 mg tablet: take 25 mg by mouth    -patient not taking. He says he started taking it but \"sobered up\" and then \"didn't need it.\"      Changed: none    Additional medication history information (including reliability of information, actions taken by pharmacist):     This patient's home medications were reviewed. He denies any other medications at this time.     Time spent in this activity: 15 minutes    Medication history completed by: Lisha Shepard Pd3 pharmacy intern    Prior to Admission medications    Medication Sig Last Dose Taking? Auth Provider   nicotine (NICODERM CQ) 7 MG/24HR 24 hr patch Place 1 patch onto the skin every 24 hours 2/10/2020 at 1900 Yes Unknown, Entered By History       "

## 2020-02-13 VITALS
HEART RATE: 85 BPM | HEIGHT: 76 IN | TEMPERATURE: 97.5 F | WEIGHT: 229 LBS | BODY MASS INDEX: 27.89 KG/M2 | OXYGEN SATURATION: 99 % | SYSTOLIC BLOOD PRESSURE: 141 MMHG | RESPIRATION RATE: 16 BRPM | DIASTOLIC BLOOD PRESSURE: 91 MMHG

## 2020-02-13 PROCEDURE — 99239 HOSP IP/OBS DSCHRG MGMT >30: CPT | Performed by: PSYCHIATRY & NEUROLOGY

## 2020-02-13 PROCEDURE — 25000128 H RX IP 250 OP 636: Performed by: PSYCHIATRY & NEUROLOGY

## 2020-02-13 PROCEDURE — 25000132 ZZH RX MED GY IP 250 OP 250 PS 637: Performed by: PSYCHIATRY & NEUROLOGY

## 2020-02-13 PROCEDURE — 90686 IIV4 VACC NO PRSV 0.5 ML IM: CPT | Performed by: PSYCHIATRY & NEUROLOGY

## 2020-02-13 RX ORDER — MULTIPLE VITAMINS W/ MINERALS TAB 9MG-400MCG
1 TAB ORAL DAILY
Qty: 30 TABLET | Refills: 0 | Status: SHIPPED | OUTPATIENT
Start: 2020-02-13

## 2020-02-13 RX ORDER — NALTREXONE HYDROCHLORIDE 50 MG/1
50 TABLET, FILM COATED ORAL DAILY
Qty: 30 TABLET | Refills: 0 | Status: SHIPPED | OUTPATIENT
Start: 2020-02-13

## 2020-02-13 RX ORDER — DISULFIRAM 250 MG/1
250 TABLET ORAL DAILY
Qty: 30 TABLET | Refills: 0 | Status: SHIPPED | OUTPATIENT
Start: 2020-02-13

## 2020-02-13 RX ORDER — BUSPIRONE HYDROCHLORIDE 5 MG/1
5 TABLET ORAL 3 TIMES DAILY
Qty: 90 TABLET | Refills: 0 | Status: SHIPPED | OUTPATIENT
Start: 2020-02-13

## 2020-02-13 RX ORDER — LANOLIN ALCOHOL/MO/W.PET/CERES
100 CREAM (GRAM) TOPICAL DAILY
Qty: 30 TABLET | Refills: 0 | Status: SHIPPED | OUTPATIENT
Start: 2020-02-13

## 2020-02-13 RX ADMIN — FOLIC ACID 1 MG: 1 TABLET ORAL at 08:38

## 2020-02-13 RX ADMIN — MULTIPLE VITAMINS W/ MINERALS TAB 1 TABLET: TAB at 08:37

## 2020-02-13 RX ADMIN — THIAMINE HCL TAB 100 MG 100 MG: 100 TAB at 08:38

## 2020-02-13 RX ADMIN — Medication 25 MG: at 08:37

## 2020-02-13 RX ADMIN — BUSPIRONE HYDROCHLORIDE 5 MG: 5 TABLET ORAL at 08:37

## 2020-02-13 RX ADMIN — INFLUENZA A VIRUS A/BRISBANE/02/2018 IVR-190 (H1N1) ANTIGEN (FORMALDEHYDE INACTIVATED), INFLUENZA A VIRUS A/KANSAS/14/2017 X-327 (H3N2) ANTIGEN (FORMALDEHYDE INACTIVATED), INFLUENZA B VIRUS B/PHUKET/3073/2013 ANTIGEN (FORMALDEHYDE INACTIVATED), AND INFLUENZA B VIRUS B/MARYLAND/15/2016 BX-69A ANTIGEN (FORMALDEHYDE INACTIVATED) 0.5 ML: 15; 15; 15; 15 INJECTION, SUSPENSION INTRAMUSCULAR at 10:41

## 2020-02-13 NOTE — PROGRESS NOTES
Patient discharged to home. Reports being picked up by father.   Patient escorted off the unit by Jacqueline DREW.       All patient belongings from room, medications, locked bin and security were sent with patient. Antabuse is on back order and patient informed he could look to other pharmacies that may have it and transfer script from . This RN went over discharge instructions, teachings, patient's labs,medications and discharge recommendations.  Patient verbalizes and demonstrates understanding of all teachings. Patient denies thoughts of harm towards self or others. Pt denies any current medical, has no further questions and has been discharge at this time.

## 2020-02-13 NOTE — H&P
Admitted:     2020      IDENTIFYING INFORMATION:  The patient is a 29-year-old  male.  He is , has no kids.  He is a city .      CHIEF COMPLAINT:  Alcohol.      HISTORY OF PRESENT ILLNESS:  The patient began to drink alcohol at age of 18.  By 25 it was a problem.  He had an intervention with his wife and he was sober 2018 to  and then he began to drink and then he went to treatment again as an outpatient.  He was there from 2019-2019, however, his sponsor  and then relapsed and has been drinking.  He realized that he needs to get help.  He has numerous stressors, his family, his brother lives in his basement, his mother-in-law lives with him, he has a sick dog, he has money stress . he is struggling with money and this is overwhelming for him.  He is drinking every day.  He is hiding his drinking from his wife.  He is drinking 1 liter.  He has tolerance.  He has withdrawal including tremors, agitation, myalgia, nausea and chills.  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  He is an only child.  He feels a lot of pressure.  He has progressive use, loss of control, tried to quit unsuccessfully, despite negative consequences, finances, family problems.  He does not use any drugs, does not smoke.  He is on a patch.  He does not leblanc.      The patient states that he has anxiety, worries about everything.  It impacts his sleep, concentration.  He feels very defensive, makes him tense.  He worries about bad things happening to his family.  He has a therapist who also told him that he would meet indications for anxiety.  This anxiety occurs even when he is sober.  He does not have any suicidal or homicidal ideation, plan or intent.  Denies any panic disorder.  Denies any depression, denies any PTSD.  He has been prescribed Zoloft in the past.      PAST PSYCHIATRIC HISTORY:  He was never psychiatrically hospitalized.  He was in outpatient as described above.      PAST  MEDICAL HISTORY:  Vitals are as below.      VITAL SIGNS:  Temperature of 97.9, pulse of 85, respiratory rate of 16, blood pressure 154/92.      REVIEW OF SYSTEMS:  The patient denied any head, eye, ear, nose, throat.  Denied any respiratory problems.  Denied any cardiovascular, gastrointestinal, genitourinary, musculoskeletal, neurological, endocrine, skin problems .  Psychiatric problems as described above.      FAMILY HISTORY:  Alcoholism is rampant in his family, both grandparents on father's and uncles.      SOCIAL HISTORY:  Born in [place].  He is an only child.  He is a perfectionist.  He has no abuse.  He has a bachelor's education.      MENTAL STATUS EXAMINATION:  The patient is a 29-year-old  male who appears his stated age.  He has adequate grooming, adequate hygiene, maintains good eye contact, cooperative, no psychomotor abnormalities.  No gait problems.  Mood is anxious.  Affect is congruent.  Speech is spontaneous, normal rate.  Linear thought process, no loosening of association.  Does not have any active suicidal or homicidal ideation.  Does not have auditory or visual hallucinations.  Insight and judgment are fair.  Alert, oriented x 3.  Attention, concentration is intact.  Recent and remote memory intact.  Language, fund of knowledge are intact.  Muscle strength is normal, gait is normal.      DIAGNOSIS:   Axis I:   1. Alcohol use disorder, severe.   2. Alcohol withdrawal complicated by elevated pulse and elevated blood pressure.      PLAN:  The patient is having a tremor, sweats, pulse.  He had 10 mg of Valium since his admission.  He needs inpatient level of detox because he is hiding his drinking and he is not able to stop using drugs in the community due to physical and psychological symptoms, continued to use will put patient at risk of psychiatric and complications.  I have reviewed labs with the patient and summarized the results with the patient.  I reviewed old records.  I  have spoken with the nurse about medications and detox course.  I spoke with the  about patient treatment options.  The patient will be detoxed off alcohol using MSSA problems.  I have ordered the following medications:  I have put him on BuSpar 5 mg 3 times a day for his anxiety.  For his craving I put him on naltrexone 25 mg. I will increase it.  The patient wants to be on Antabuse when he is discharged.  The patient does not have any active suicidal plan or intent.      Revised encounter 2020  [facility] [place]            NASREEN CALLEJAS MD             D: 2020   T: 2020   MT: CHELE      Name:     MAYRA ALICEA   MRN:      [id number]        Account:      [id number]   :      1990        Admitted:     2020                   Document: [id number]

## 2020-02-13 NOTE — DISCHARGE INSTRUCTIONS
Behavioral Discharge Planning and Instructions  THANK YOU FOR CHOOSING THE [facility]  3A  [phone]    Summary: You were admitted to [facility] on 2/11/20 for detoxification from alcohol.  A medical exam was performed that included lab work. You have met with a  and opted to decline assessment and referral to treatment.  You plan to continue with AA and work with your sponsor  Please take care and make your recovery a priority, Nolan!    Recommendation:  If you need more support to maintain sobriety call [phone] to schedule a chemical assessment and follow the recommendations of that assessment.      Main Diagnosis: Per Dr. Amanda Doshi MD;  303.90 (F10.20) Alcohol Use Disorder Severe      Major Treatments, Procedures and Findings:  You were detoxed from alcohol with the Modified Selective Severity Protocol using Valium. You have met with a  to develop a treatment plan for discharge.  You have had labs drawn and a copy of those labs will be sent home with you.  Please bring your lab results with to your follow up doctor appointment.    Symptoms to Report:  If you experience more anxiety, confusion, sleeplessness, deep sadness or thoughts of suicide, notify your treatment team or notify your primary care physician. IF ANY OF THE SYMPTOMS YOU ARE EXPERIENCING ARE A MEDICAL EMERGENCY CALL 911 IMMEDIATELY.     Lifestyle Adjustment: Adjust your lifestyle to get enough sleep, relaxation, exercise and  good nutrition. Continue to develop healthy coping skills to decrease stress and promote a sober living environment. Do not use alcohol, illegal drugs or addictive medications other than what is currently prescribed. AA, NA, and  Sponsor are excellent resources for support.     Primary Provider:  [facility]  Address: [address], [place] [address]  Phone: [phone]    Appointment: 2/14/20 at 7:10am        Disposition:  Home          DISCHARGE RESOURCES:  -SMART Recovery - self management for addiction recovery:  www.smartrecovery.org    -Pathways ~ A Health Crisis Resource & Support Center: 373.255.8400.  -[facility] [phone]   -[facility] 311-778-0454 or 989-339-5051.  -Suicide Awareness Voices of Education (SAVE) (www.save.org): 524-045-GQEU (2532)  -National Suicide Prevention Line (www.mentalhealthmn.org): 574-286-CANP (0910)  -National [place] on Mental Illness (www.[place].trina.org): 988.340.1308 or 844-819-4678.  -Igta1ubnz: text the word LIFE to 42946 for immediate support and crisis intervention  -Mental Health Consumer/Survivor Network of [place] (www.mhcsn.net): 346.278.2853 or 041-365-7302  -Mental Health Association of [place] (www.mentalhealth.org): 480.180.3720 or 921-513-2187     -Substance Abuse and Mental Health Services (www.samhsa.gov)  -Harm Reduction Coalition (www. Harmreduction.org)  -www.prescribetoprevent.org or http://prescribetoprevent.org/video  -Poison control 1-338.981.1844   **[place] Opioid Prevention Coalition: www.opioidcoalition.org    Sober Support Group Information:  AA/NA & Sponsor/Support  -Alcoholics Anonymous (www.alcoholics-anonymous.org): for local information 24 hours/day  -AA Intergroup service office in [place] (http://www.aastpaul.org/) 804.960.1963  -AA Intergroup service office in [facility]: 686.178.4560. (http://www.a[place].org/)  -Narcotics Anonymous (www.nam[place].org) (140) 730-8317   **Sober Fun Activities: www.soberCosyforyouactivities.[facility]//[facility] Recovery Connection (MRC)  MRC connects people seeking recovery to resources that help foster and sustain long-term recovery.  Whether you are seeking resources for treatment, transportation, housing, job training, education, health care or other pathways to recovery, [facility] is a great place to start.    Phone: 911.204.1334.  www.[facility]  (Great listing of all types of recovery and non-recovery related resources)      General Medication Instructions:   See your medication sheet(s) for instructions.   Take all medicines as directed.  Make no changes unless your doctor suggests them.   Go to all your doctor visits.  Be sure to have all your required lab tests. This way, your medicines can be refilled on time.  Do not use any drugs not prescribed by your provider.  AA/NA and Sponsors are excellent resources for support  Avoid alcohol.    Any follow up concerns:  Nursing questions call the Unit -[facility] [phone]  Medical Record call [phone]  Outpatient Behavioral Intake call [phone]  LP+ Wait List/Bed Availability call [phone]    The entire treatment team has appreciated the opportunity to work with you Nolan.  We wish you the best in the future and with your lifelong recovery goals. Please bring this discharge folder with you to all follow up appointments.  It contains your lab results, diagnosis, medication list and discharge recommendations.    THANK YOU FOR CHOOSING THE [facility]

## 2020-02-13 NOTE — PLAN OF CARE
S:  Thanh has been visible in the milieu.  He has been participating in group (AA).  He is eating and drinking normally.  His last two MSSA scores were 5 & 5. He has not required any medication for alcohol withdrawal for more than 24 hours.  B:  History of anxiety, asthma and alcohol abuse.  A:  Pt medically stable in the detox process AEB MSSA scores of 5 & 5 and < 8 for > 24 hours.  R:  Pt removed from the alcohol monitoring process (taken OOD).  Continue to monitor VS and prepare for discharge.

## 2020-03-11 ENCOUNTER — HEALTH MAINTENANCE LETTER (OUTPATIENT)
Age: 30
End: 2020-03-11

## 2021-01-03 ENCOUNTER — HEALTH MAINTENANCE LETTER (OUTPATIENT)
Age: 31
End: 2021-01-03

## 2021-04-25 ENCOUNTER — HEALTH MAINTENANCE LETTER (OUTPATIENT)
Age: 31
End: 2021-04-25

## 2021-05-31 ENCOUNTER — RECORDS - HEALTHEAST (OUTPATIENT)
Dept: ADMINISTRATIVE | Facility: CLINIC | Age: 31
End: 2021-05-31

## 2021-10-10 ENCOUNTER — HEALTH MAINTENANCE LETTER (OUTPATIENT)
Age: 31
End: 2021-10-10

## 2022-05-21 ENCOUNTER — HEALTH MAINTENANCE LETTER (OUTPATIENT)
Age: 32
End: 2022-05-21

## 2022-09-18 ENCOUNTER — HEALTH MAINTENANCE LETTER (OUTPATIENT)
Age: 32
End: 2022-09-18

## 2023-06-04 ENCOUNTER — HEALTH MAINTENANCE LETTER (OUTPATIENT)
Age: 33
End: 2023-06-04